# Patient Record
Sex: FEMALE | Race: WHITE | Employment: PART TIME | URBAN - METROPOLITAN AREA
[De-identification: names, ages, dates, MRNs, and addresses within clinical notes are randomized per-mention and may not be internally consistent; named-entity substitution may affect disease eponyms.]

---

## 2017-01-25 ENCOUNTER — OFFICE VISIT (OUTPATIENT)
Dept: FAMILY MEDICINE CLINIC | Age: 32
End: 2017-01-25

## 2017-01-25 VITALS
HEART RATE: 64 BPM | HEIGHT: 65 IN | WEIGHT: 145 LBS | SYSTOLIC BLOOD PRESSURE: 116 MMHG | BODY MASS INDEX: 24.16 KG/M2 | OXYGEN SATURATION: 99 % | DIASTOLIC BLOOD PRESSURE: 82 MMHG | RESPIRATION RATE: 16 BRPM | TEMPERATURE: 98.3 F

## 2017-01-25 DIAGNOSIS — G56.03 BILATERAL CARPAL TUNNEL SYNDROME: Primary | ICD-10-CM

## 2017-01-25 DIAGNOSIS — M25.561 CHRONIC PAIN OF BOTH KNEES: ICD-10-CM

## 2017-01-25 DIAGNOSIS — M25.562 CHRONIC PAIN OF BOTH KNEES: ICD-10-CM

## 2017-01-25 DIAGNOSIS — G89.29 CHRONIC PAIN OF BOTH KNEES: ICD-10-CM

## 2017-01-25 NOTE — MR AVS SNAPSHOT
Visit Information Date & Time Provider Department Dept. Phone Encounter #  
 1/25/2017 11:00 AM Silver Han, [facility] [id number] Follow-up Instructions Return if symptoms worsen or fail to improve, for for well exam. Upcoming Health Maintenance Date Due DTaP/Tdap/Td series (1 - Tdap) 7/27/2007 PAP AKA CERVICAL CYTOLOGY 10/11/2015 INFLUENZA AGE 9 TO ADULT 8/1/2016 Allergies as of 1/25/2017  Review Complete On: 1/25/2017 By: Silver Han MD  
 No Known Allergies Current Immunizations  Never Reviewed No immunizations on file. Not reviewed this visit You Were Diagnosed With   
  
 Codes Comments Bilateral carpal tunnel syndrome    -  Primary ICD-10-CM: G56.03 
ICD-9-CM: 354.0 Chronic pain of both knees     ICD-10-CM: M25.561, M25.562, G89.29 ICD-9-CM: 719.46, 338.29 Vitals BP Pulse Temp Resp Height(growth percentile) Weight(growth percentile) 116/82 (BP 1 Location: Left arm, BP Patient Position: Sitting) 64 98.3 °F (36.8 °C) (Oral) 16 5' 5\" (1.651 m) 145 lb (65.8 kg) SpO2 BMI OB Status Smoking Status 99% 24.13 kg/m2 IUD Never Smoker BMI and BSA Data Body Mass Index Body Surface Area  
 24.13 kg/m 2 1.74 m 2 Preferred Pharmacy Pharmacy Name Phone [address], [address] [place] [address] [phone] Your Updated Medication List  
  
   
This list is accurate as of: 1/25/17 11:53 AM.  Always use your most recent med list.  
  
  
  
  
 cetirizine 10 mg tablet Commonly known as:  ZYRTEC Take 10 mg by mouth. Follow-up Instructions Return if symptoms worsen or fail to improve, for for well exam.  
  
  
Patient Instructions Carpal Tunnel Syndrome: Exercises Your Care Instructions Here are some examples of typical rehabilitation exercises for your condition. Start each exercise slowly. Ease off the exercise if you start to have pain. Your doctor or your physical or occupational therapist will tell you when you can start these exercises and which ones will work best for you. How to do the exercises Note: When you no longer have pain or numbness, you can do exercises to help prevent carpal tunnel syndrome from coming back. Do not do any stretch or movement that is uncomfortable or painful. Warm-up stretches 1. Rotate your wrist up, down, and from side to side. Repeat 4 times. 2. Stretch your fingers far apart. Relax them, and then stretch them again. Repeat 4 times. 3. Stretch your thumb by pulling it back gently, holding it, and then releasing it. Repeat 4 times. Prayer stretch 1. Start with your palms together in front of your chest just below your chin. 2. Slowly lower your hands toward your waistline, keeping your hands close to your stomach and your palms together until you feel a mild to moderate stretch under your forearms. 3. Hold for at least 15 to 30 seconds. Repeat 2 to 4 times. Wrist flexor stretch 1. Extend your arm in front of you with your palm up. 2. Bend your wrist, pointing your hand toward the floor. 3. With your other hand, gently bend your wrist farther until you feel a mild to moderate stretch in your forearm. 4. Hold for at least 15 to 30 seconds. Repeat 2 to 4 times. Wrist extensor stretch Repeat steps 1 through 4 of the stretch above, but begin with your extended hand palm down. Follow-up care is a key part of your treatment and safety. Be sure to make and go to all appointments, and call your doctor if you are having problems. It's also a good idea to know your test results and keep a list of the medicines you take. Where can you learn more? Go to http://tushar-nory.info/. Enter H047 in the search box to learn more about \"Carpal Tunnel Syndrome: Exercises. \" Current as of: May 23, 2016 Content Version: 11.1 © 6467-9479 [employer]. Care instructions adapted under license by [employer] (which disclaims liability or warranty for this information). If you have questions about a medical condition or this instruction, always ask your healthcare professional. [address] any warranty or liability for your use of this information. Knee: Exercises Your Care Instructions Here are some examples of exercises for your knee. Start each exercise slowly. Ease off the exercise if you start to have pain. Your doctor or physical therapist will tell you when you can start these exercises and which ones will work best for you. How to do the exercises [employer] 4. Sit with your leg straight and supported on the floor or a firm bed. (If you feel discomfort in the front or back of your knee, place a small towel roll under your knee.) 5. Tighten the muscles on top of your thigh by pressing the back of your knee flat down to the floor. (If you feel discomfort under your kneecap, place a small towel roll under your knee.) 6. Hold for about 6 seconds, then rest for up to 10 seconds. 7. Do 8 to 12 repetitions several times a day. Straight-leg raises to the front 4. Lie on your back with your good knee bent so that your foot rests flat on the floor. Your injured leg should be straight. Make sure that your low back has a normal curve. You should be able to slip your flat hand in between the floor and the small of your back, with your palm touching the floor and your back touching the back of your hand. 5. Tighten the thigh muscles in the injured leg by pressing the back of your knee flat down to the floor. Hold your knee straight. 6. Keeping the thigh muscles tight, lift your injured leg up so that your heel is about 12 inches off the floor. Hold for about 6 seconds and then lower slowly. 7. Do 8 to 12 repetitions, 3 times a day. Straight-leg raises to the outside 5. Lie on your side, with your injured leg on top. 6. Tighten the front thigh muscles of your injured leg to keep your knee straight. 7. Keep your hip and your leg straight in line with the rest of your body, and keep your knee pointing forward. Do not drop your hip back. 8. Lift your injured leg straight up toward the ceiling, about 12 inches off the floor. Hold for about 6 seconds, then slowly lower your leg. 9. Do 8 to 12 repetitions. Straight-leg raises to the back 1. Lie on your stomach, and lift your leg straight up behind you (toward the ceiling). 2. Lift your toes about 6 inches off the floor, hold for about 6 seconds, then lower slowly. 3. Do 8 to 12 repetitions. Straight-leg raises to the inside 1. Lie on the side of your body with the injured leg. 2. You can either prop your other (good) leg up on a chair, or you can bend your good knee and put that foot in front of your injured knee. Do not drop your hip back. 3. Tighten the muscles on the front of your thigh to straighten your injured knee. 4. Keep your kneecap pointing forward, and lift your whole leg up toward the ceiling about 6 inches. Hold for about 6 seconds, then lower slowly. 5. Do 8 to 12 repetitions. Heel dig bridging 1. Lie on your back with both knees bent and your ankles bent so that only your heels are digging into the floor. Your knees should be bent about 90 degrees. 2. Then push your heels into the floor, squeeze your buttocks, and lift your hips off the floor until your shoulders, hips, and knees are all in a straight line. 3. Hold for about 6 seconds as you continue to breathe normally, and then slowly lower your hips back down to the floor and rest for up to 10 seconds. 4. Do 8 to 12 repetitions. Hamstring curls 1. Lie on your stomach with your knees straight. If your kneecap is uncomfortable, roll up a washcloth and put it under your leg just above your kneecap. 2. Lift the foot of your injured leg by bending the knee so that you bring the foot up toward your buttock. If this motion hurts, try it without bending your knee quite as far. This may help you avoid any painful motion. 3. Slowly lower your leg back to the floor. 4. Do 8 to 12 repetitions. 5. With permission from your doctor or physical therapist, you may also want to add a cuff weight to your ankle (not more than 5 pounds). With weight, you do not have to lift your leg more than 12 inches to get a hamstring workout. Shallow standing knee bends Note: Do this exercise only if you have very little pain; if you have no clicking, locking, or giving way if you have an injured knee; and if it does not hurt while you are doing 8 to 12 repetitions. 1. Stand with your hands lightly resting on a counter or chair in front of you. Put your feet shoulder-width apart. 2. Slowly bend your knees so that you squat down like you are going to sit in a chair. Make sure your knees do not go in front of your toes. 3. Lower yourself about 6 inches. Your heels should remain on the floor at all times. 4. Rise slowly to a standing position. Heel raises 1. Stand with your feet a few inches apart, with your hands lightly resting on a counter or chair in front of you. 2. Slowly raise your heels off the floor while keeping your knees straight. 3. Hold for about 6 seconds, then slowly lower your heels to the floor. 4. Do 8 to 12 repetitions several times during the day. Follow-up care is a key part of your treatment and safety. Be sure to make and go to all appointments, and call your doctor if you are having problems. It's also a good idea to know your test results and keep a list of the medicines you take. Where can you learn more? Go to http://tushar-nory.info/. Enter T198 in the search box to learn more about \"Knee: Exercises. \" Current as of: May 23, 2016 Content Version: 11.1 © 0868-3611 [employer]. Care instructions adapted under license by [employer] (which disclaims liability or warranty for this information). If you have questions about a medical condition or this instruction, always ask your healthcare professional. [address] any warranty or liability for your use of this information. Introducing [facility]! [employer] introduces [employer] patient portal. Now you can access parts of your medical record, email your doctor's office, and request medication refills online. 1. In your internet browser, go to [email or web address] 2. Click on the First Time User? Click Here link in the Sign In box. You will see the New Member Sign Up page. 3. Enter your [employer] Access Code exactly as it appears below. You will not need to use this code after youve completed the sign-up process. If you do not sign up before the expiration date, you must request a new code. · [employer] Access Code: [email or web address] Expires: 4/25/2017 11:53 AM 
 
4. Enter the last four digits of your Social Security Number (xxxx) and Date of Birth (mm/dd/yyyy) as indicated and click Submit. You will be taken to the next sign-up page. 5. Create a [employer] ID. This will be your [employer] login ID and cannot be changed, so think of one that is secure and easy to remember. 6. Create a [employer] password. You can change your password at any time. 7. Enter your Password Reset Question and Answer. This can be used at a later time if you forget your password. 8. Enter your e-mail address. You will receive e-mail notification when new information is available in [address] 9. Click Sign Up. You can now view and download portions of your medical record. 10. Click the Download Summary menu link to download a portable copy of your medical information.  
 
If you have questions, please visit the Frequently Asked Questions section of the [employer]. Remember, [employer] is NOT to be used for urgent needs. For medical emergencies, dial 911. Now available from your iPhone and Android! Please provide this summary of care documentation to your next provider. Your primary care clinician is listed as NONE. If you have any questions after today's visit, please call [phone].

## 2017-01-25 NOTE — PROGRESS NOTES
HISTORY OF PRESENT ILLNESS  Antonia Hunter is a 28 y.o. female. HPI  Patient is here today for evaluation and treatment of: Bilateral Hand Numbness / Bilateral Knee Pain    Hand Numbness:  Present X 2 years; states that she noted worsening sx in hands over the holidays; she works as a  over the holidays and repetitively moves boxes which causes more pain ; pt has used aspercreme and copper gloves which have helped;  Pain is about a 6/10 in intensity; Has had diificulty sleeping with the pain ;     Knee Pain: c/o knee pain for the last few month she states when she sqats and gets up her knees hurt; no swelling; She does bend a lot with her current job as well; This is when she notices the sx most.    PMH,  Meds, Allergies, Family History, Social history reviewed        Current Outpatient Prescriptions:     cetirizine (ZYRTEC) 10 mg tablet, Take 10 mg by mouth., Disp: , Rfl:     Review of Systems   Constitutional: Negative for chills and fever. Musculoskeletal: Negative for falls and joint pain. Physical Exam   Constitutional: She appears well-developed and well-nourished. No distress. Cardiovascular: Normal rate and regular rhythm. Exam reveals no gallop and no friction rub. No murmur heard. Pulmonary/Chest: Breath sounds normal. No respiratory distress. She has no wheezes. She has no rales. Nursing note and vitals reviewed. hands; no edema; + tinels bilaterally; no evidence of synovitis; No arthritic nodules; no edema of hand;  NO TTP at wrists; Right thumb; no TTP ; neg finklesteins    Knees - grossly within normal limits; No joint laxity with varus or valgus stress;   No joint line tenderness to palpation; + crepitus with passive ROM at right knee; none on left  Visit Vitals    /82 (BP 1 Location: Left arm, BP Patient Position: Sitting)    Pulse 64    Temp 98.3 °F (36.8 °C) (Oral)    Resp 16    Ht 5' 5\" (1.651 m)    Wt 145 lb (65.8 kg)    SpO2 99%    BMI 24.13 kg/m2         ASSESSMENT and PLAN    ICD-10-CM ICD-9-CM    1. Bilateral carpal tunnel syndrome G56.03 354.0    2. Chronic pain of both knees M25.561 719.46     M25.562 338.29     G89.29         As above,   All new  Knee pain likely due to mechanics  Pt prefers conservative management at this time  Advised continued use of wrist splints ( copper or otherwise) NSAID if needed;  Prudent use advised  CTS exercises provided; If sx worsen, notify MD  Knee exercises advised for knee pain; avoid motions which aggravate sx  Follow-up Disposition:  Return if symptoms worsen or fail to improve, for for well exam.  An After Visit Summary was printed and given to the patient. This has been fully explained to the patient, who indicates understanding.

## 2017-01-25 NOTE — PATIENT INSTRUCTIONS
Carpal Tunnel Syndrome: Exercises  Your Care Instructions  Here are some examples of typical rehabilitation exercises for your condition. Start each exercise slowly. Ease off the exercise if you start to have pain. Your doctor or your physical or occupational therapist will tell you when you can start these exercises and which ones will work best for you. How to do the exercises  Note: When you no longer have pain or numbness, you can do exercises to help prevent carpal tunnel syndrome from coming back. Do not do any stretch or movement that is uncomfortable or painful. Warm-up stretches  1. Rotate your wrist up, down, and from side to side. Repeat 4 times. 2. Stretch your fingers far apart. Relax them, and then stretch them again. Repeat 4 times. 3. Stretch your thumb by pulling it back gently, holding it, and then releasing it. Repeat 4 times. Prayer stretch    1. Start with your palms together in front of your chest just below your chin. 2. Slowly lower your hands toward your waistline, keeping your hands close to your stomach and your palms together until you feel a mild to moderate stretch under your forearms. 3. Hold for at least 15 to 30 seconds. Repeat 2 to 4 times. Wrist flexor stretch    1. Extend your arm in front of you with your palm up. 2. Bend your wrist, pointing your hand toward the floor. 3. With your other hand, gently bend your wrist farther until you feel a mild to moderate stretch in your forearm. 4. Hold for at least 15 to 30 seconds. Repeat 2 to 4 times. Wrist extensor stretch    Repeat steps 1 through 4 of the stretch above, but begin with your extended hand palm down. Follow-up care is a key part of your treatment and safety. Be sure to make and go to all appointments, and call your doctor if you are having problems. It's also a good idea to know your test results and keep a list of the medicines you take. Where can you learn more?   Go to http://tushar-nory.info/. Enter N641 in the search box to learn more about \"Carpal Tunnel Syndrome: Exercises. \"  Current as of: May 23, 2016  Content Version: 11.1  © 6637-6380 [employer]. Care instructions adapted under license by [employer] (which disclaims liability or warranty for this information). If you have questions about a medical condition or this instruction, always ask your healthcare professional. [place] [address] any warranty or liability for your use of this information. Knee: Exercises  Your Care Instructions  Here are some examples of exercises for your knee. Start each exercise slowly. Ease off the exercise if you start to have pain. Your doctor or physical therapist will tell you when you can start these exercises and which ones will work best for you. How to do the exercises  Quad sets    4. Sit with your leg straight and supported on the floor or a firm bed. (If you feel discomfort in the front or back of your knee, place a small towel roll under your knee.)  5. Tighten the muscles on top of your thigh by pressing the back of your knee flat down to the floor. (If you feel discomfort under your kneecap, place a small towel roll under your knee.)  6. Hold for about 6 seconds, then rest for up to 10 seconds. 7. Do 8 to 12 repetitions several times a day. Straight-leg raises to the front    4. Lie on your back with your good knee bent so that your foot rests flat on the floor. Your injured leg should be straight. Make sure that your low back has a normal curve. You should be able to slip your flat hand in between the floor and the small of your back, with your palm touching the floor and your back touching the back of your hand. 5. Tighten the thigh muscles in the injured leg by pressing the back of your knee flat down to the floor. Hold your knee straight.   6. Keeping the thigh muscles tight, lift your injured leg up so that your heel is about 12 inches off the floor. Hold for about 6 seconds and then lower slowly. 7. Do 8 to 12 repetitions, 3 times a day. Straight-leg raises to the outside    5. Lie on your side, with your injured leg on top. 6. Tighten the front thigh muscles of your injured leg to keep your knee straight. 7. Keep your hip and your leg straight in line with the rest of your body, and keep your knee pointing forward. Do not drop your hip back. 8. Lift your injured leg straight up toward the ceiling, about 12 inches off the floor. Hold for about 6 seconds, then slowly lower your leg. 9. Do 8 to 12 repetitions. Straight-leg raises to the back    1. Lie on your stomach, and lift your leg straight up behind you (toward the ceiling). 2. Lift your toes about 6 inches off the floor, hold for about 6 seconds, then lower slowly. 3. Do 8 to 12 repetitions. Straight-leg raises to the inside    1. Lie on the side of your body with the injured leg. 2. You can either prop your other (good) leg up on a chair, or you can bend your good knee and put that foot in front of your injured knee. Do not drop your hip back. 3. Tighten the muscles on the front of your thigh to straighten your injured knee. 4. Keep your kneecap pointing forward, and lift your whole leg up toward the ceiling about 6 inches. Hold for about 6 seconds, then lower slowly. 5. Do 8 to 12 repetitions. Heel dig bridging    1. Lie on your back with both knees bent and your ankles bent so that only your heels are digging into the floor. Your knees should be bent about 90 degrees. 2. Then push your heels into the floor, squeeze your buttocks, and lift your hips off the floor until your shoulders, hips, and knees are all in a straight line. 3. Hold for about 6 seconds as you continue to breathe normally, and then slowly lower your hips back down to the floor and rest for up to 10 seconds. 4. Do 8 to 12 repetitions. Hamstring curls    1.  Lie on your stomach with your knees straight. If your kneecap is uncomfortable, roll up a washcloth and put it under your leg just above your kneecap. 2. Lift the foot of your injured leg by bending the knee so that you bring the foot up toward your buttock. If this motion hurts, try it without bending your knee quite as far. This may help you avoid any painful motion. 3. Slowly lower your leg back to the floor. 4. Do 8 to 12 repetitions. 5. With permission from your doctor or physical therapist, you may also want to add a cuff weight to your ankle (not more than 5 pounds). With weight, you do not have to lift your leg more than 12 inches to get a hamstring workout. Shallow standing knee bends    Note: Do this exercise only if you have very little pain; if you have no clicking, locking, or giving way if you have an injured knee; and if it does not hurt while you are doing 8 to 12 repetitions. 1. Stand with your hands lightly resting on a counter or chair in front of you. Put your feet shoulder-width apart. 2. Slowly bend your knees so that you squat down like you are going to sit in a chair. Make sure your knees do not go in front of your toes. 3. Lower yourself about 6 inches. Your heels should remain on the floor at all times. 4. Rise slowly to a standing position. Heel raises    1. Stand with your feet a few inches apart, with your hands lightly resting on a counter or chair in front of you. 2. Slowly raise your heels off the floor while keeping your knees straight. 3. Hold for about 6 seconds, then slowly lower your heels to the floor. 4. Do 8 to 12 repetitions several times during the day. Follow-up care is a key part of your treatment and safety. Be sure to make and go to all appointments, and call your doctor if you are having problems. It's also a good idea to know your test results and keep a list of the medicines you take. Where can you learn more?   Go to http://tushar-nory.info/. Enter P107 in the search box to learn more about \"Knee: Exercises. \"  Current as of: May 23, 2016  Content Version: 11.1  © 8922-2495 [employer]. Care instructions adapted under license by [employer] (which disclaims liability or warranty for this information). If you have questions about a medical condition or this instruction, always ask your healthcare professional. [place] [address] any warranty or liability for your use of this information.

## 2017-10-18 ENCOUNTER — OFFICE VISIT (OUTPATIENT)
Dept: FAMILY MEDICINE CLINIC | Age: 32
End: 2017-10-18

## 2017-10-18 VITALS
OXYGEN SATURATION: 98 % | RESPIRATION RATE: 18 BRPM | HEART RATE: 75 BPM | DIASTOLIC BLOOD PRESSURE: 81 MMHG | HEIGHT: 65 IN | TEMPERATURE: 97.8 F | BODY MASS INDEX: 24.66 KG/M2 | SYSTOLIC BLOOD PRESSURE: 119 MMHG | WEIGHT: 148 LBS

## 2017-10-18 DIAGNOSIS — R20.2 PARESTHESIA OF RIGHT UPPER LIMB: ICD-10-CM

## 2017-10-18 DIAGNOSIS — M25.552 LEFT HIP PAIN: Primary | ICD-10-CM

## 2017-10-18 NOTE — PROGRESS NOTES
1. Have you been to the ER, urgent care clinic since your last visit? Hospitalized since your last visit? No    2. Have you seen or consulted any other health care providers outside of the [address] [place] [address] since your last visit? Include any pap smears or colon screening.  Yes Where: OBGYN at [facility] - Dr. Cici Swartz

## 2017-10-18 NOTE — PATIENT INSTRUCTIONS
Hip Bursitis: Exercises  Your Care Instructions  Here are some examples of typical rehabilitation exercises for your condition. Start each exercise slowly. Ease off the exercise if you start to have pain. Your doctor or physical therapist will tell you when you can start these exercises and which ones will work best for you. How to do the exercises  Hip rotator stretch    1. Lie on your back with both knees bent and your feet flat on the floor. 2. Put the ankle of your affected leg on your opposite thigh near your knee. 3. Use your hand to gently push your knee away from your body until you feel a gentle stretch around your hip. 4. Hold the stretch for 15 to 30 seconds. 5. Repeat 2 to 4 times. 6. Repeat steps 1 through 5, but this time use your hand to gently pull your knee toward your opposite shoulder. Iliotibial band stretch    1. Lean sideways against a wall. If you are not steady on your feet, hold on to a chair or counter. 2. Stand on the leg with the affected hip, with that leg close to the wall. Then cross your other leg in front of it. 3. Let your affected hip drop out to the side of your body and against wall. Then lean away from your affected hip until you feel a stretch. 4. Hold the stretch for 15 to 30 seconds. 5. Repeat 2 to 4 times. Straight-leg raises to the outside    1. Lie on your side, with your affected hip on top. 2. Tighten the front thigh muscles of your top leg to keep your knee straight. 3. Keep your hip and your leg straight in line with the rest of your body, and keep your knee pointing forward. Do not drop your hip back. 4. Lift your top leg straight up toward the ceiling, about 12 inches off the floor. Hold for about 6 seconds, then slowly lower your leg. 5. Repeat 8 to 12 times. Clamshell    1. Lie on your side, with your affected hip on top and your head propped on a pillow. Keep your feet and knees together and your knees bent.   2. Raise your top knee, but keep your feet together. Do not let your hips roll back. Your legs should open up like a clamshell. 3. Hold for 6 seconds. 4. Slowly lower your knee back down. Rest for 10 seconds. 5. Repeat 8 to 12 times. Follow-up care is a key part of your treatment and safety. Be sure to make and go to all appointments, and call your doctor if you are having problems. It's also a good idea to know your test results and keep a list of the medicines you take. Where can you learn more? Go to http://tushar-nory.info/. Enter G985 in the search box to learn more about \"Hip Bursitis: Exercises. \"  Current as of: March 21, 2017  Content Version: 11.3  © 3527-1474 [employer]. Care instructions adapted under license by [employer] (which disclaims liability or warranty for this information). If you have questions about a medical condition or this instruction, always ask your healthcare professional. [place] [address] any warranty or liability for your use of this information.

## 2017-10-18 NOTE — PROGRESS NOTES
HISTORY OF PRESENT ILLNESS  Nasim Shelby is a 33 y.o. female. HPI  Pt c/o numbness in her hand for a  Few months. hand numbness is better; may worsen witho begin workino have helped. Plans t activity ; sewing, etc.  States that she tries not to take any meds for the pain. Has used copper fit bracelets and theses seem to help. 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 Also c/o left hip pain. Hip pain has been present X 1 month. No injuries; Helped by walking and loosening; worse with sitting for a longer period of time. Sx will go completely away when she walks. PMH,  Meds, Allergies, Family History, Social history reviewed    Review of Systems   Cardiovascular: Negative for chest pain and palpitations. Musculoskeletal: Positive for myalgias. Negative for back pain and neck pain. Physical Exam   Constitutional: She appears well-developed and well-nourished. No distress. Cardiovascular: Normal rate and regular rhythm. Exam reveals no gallop and no friction rub. No murmur heard. Pulmonary/Chest: Breath sounds normal. No respiratory distress. She has no wheezes. She has no rales. Musculoskeletal:   + tinels at right wrist; no edema, no TTP at left hip;  ROM is intact;    Psychiatric: She has a normal mood and affect. Nursing note and vitals reviewed. ASSESSMENT and PLAN    ICD-10-CM ICD-9-CM    1. Left hip pain M25.552 719.45 XR HIP LT W OR WO PELV 2-3 VWS   2. Paresthesia of right upper limb R20.2 782.0        As above, #1 new, #2 better   treatment plan as listed below  Orders Placed This Encounter    XR HIP LT W OR WO PELV 2-3 VWS    levonorgestrel (MIRENA) 20 mcg/24 hr (5 years) IUD- med rec     Exercises to the right hip; consider tylenol PRN  Desires to monitor paresthesias which is likely due to CTS before EMG, referral  Follow-up Disposition:  Return if symptoms worsen or fail to improve.   An After Visit Summary was printed and given to the patient. This has been fully explained to the patient, who indicates understanding.

## 2017-10-20 ENCOUNTER — HOSPITAL ENCOUNTER (OUTPATIENT)
Dept: GENERAL RADIOLOGY | Age: 32
Discharge: HOME | End: 2017-10-20
Payer: COMMERCIAL

## 2017-10-20 DIAGNOSIS — M25.552 LEFT HIP PAIN: ICD-10-CM

## 2017-10-20 PROCEDURE — 73502 X-RAY EXAM HIP UNI 2-3 VIEWS: CPT

## 2017-10-30 ENCOUNTER — TELEPHONE (OUTPATIENT)
Dept: FAMILY MEDICINE CLINIC | Age: 32
End: 2017-10-30

## 2017-10-30 NOTE — TELEPHONE ENCOUNTER
Pt would like to know results of her xray please advise [phone].  Pt states still in pain off and on

## 2019-01-16 ENCOUNTER — HOSPITAL ENCOUNTER (OUTPATIENT)
Dept: GENERAL RADIOLOGY | Age: 34
Discharge: HOME | End: 2019-01-16
Payer: COMMERCIAL

## 2019-01-16 DIAGNOSIS — M54.50 LOW BACK PAIN: ICD-10-CM

## 2019-01-16 PROCEDURE — 72110 X-RAY EXAM L-2 SPINE 4/>VWS: CPT

## 2019-01-21 ENCOUNTER — HOSPITAL ENCOUNTER (OUTPATIENT)
Dept: PHYSICAL THERAPY | Age: 34
Discharge: HOME | End: 2019-01-21
Payer: COMMERCIAL

## 2019-01-21 PROCEDURE — 97140 MANUAL THERAPY 1/> REGIONS: CPT

## 2019-01-21 PROCEDURE — 97161 PT EVAL LOW COMPLEX 20 MIN: CPT

## 2019-01-21 NOTE — PROGRESS NOTES
PT DAILY TREATMENT NOTE/LUMBAR EVAL 10-18 Patient Name: Gosia Heath 
Date:2019 : 1985 [x]  Patient  Verified Payor: Sudhir Backer / Plan: Jerrilyn [employer] HMO / Product Type: HMO /   
In time:10:00  Out time:10:44 Total Treatment Time (min): 44 Visit #: 1  Treatment Area: Low back pain [M54.5] SUBJECTIVE Pain Level (0-10 scale): 1/10 []constant [x]intermittent []improving []worsening []no change since onset Any medication changes, allergies to medications, adverse drug reactions, diagnosis change, or new procedure performed?: [x] No    [] Yes (see summary sheet for update) Subjective functional status/changes:    
PLOF: Ind & pain free with all activites Limitations to PLOF: pain at times so intense; unable to ambulate Mechanism of Injury: 19 Pt woke up with severe LBP that kept her from being able to walk. No identifiable mechanism of injury; pt suspects increase in physical demand at work over the holiday season caused onset of pain. Pain & numbness in BLE (anterior & posterior) for the first 3 days; has not occurred since. MD prescribed muscle relaxer however pt has not utilized them due to s/s being \"managable\" since. Current symptoms/Complaints: intermittent pain limiting ability to complete professional duties (lifting boxes up to 50lbs) & pain with movement. Previous Treatment/Compliance: Dr visit for pain; compliant with HEP stretching from Dr: no utilization of muscle relaxer Rx. Never attended PT for anything. PMHx/Surgical Hx: ovarian cyst surgery: last one ~. Currently no menstruation secondary to IUD. 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 Barriers: []pain []financial []time []transportation [x]none Motivation: good Cognition: A & O x 3 OBJECTIVE/EXAMINATION 
 34 min [x]Eval                  []Re-Eval  
 
10 min Manual: STM & TPR to lumbar & thoracic paraspinals, glut med & max Rationale: increase ROM and decrease pain  to improve the patients ability to return to PLOF with less pain. With 
 [] TE 
 [x] TA 
 [] neuro 
 [] other: Patient Education: [x] Review HEP [] Progressed/Changed HEP based on:  
[] positioning   [x] body mechanics   [] transfers   [] heat/ice application   
[] other:   
 
Other Objective/Functional Measures:  
 
Symptoms: 
Aggravated by: 
 [x] Bending: lumbar flexion [] Sitting [] Standing [] Walking 
 [] Moving [] Cough [] Sneeze [] Valsalva 
 [] AM  [] PM  Lying:  [] sup   [] pro   [] sidelying 
 [] Other: 
  
Eased by:  
 [x] Bending: lumbar extension [] Sitting [] Standing [] Walking 
 [] Moving [] AM  [] PM  Lying: [] sup  [] pro  [] sidelying 
 [] Other: 
  
General Health:  Red Flags Indicated? [] Yes    [x] No 
[] Yes [x] No Recent weight change (If yes, due to dieting? [] Yes  [] No) [] Yes [] No Weakness in legs during walking 
[] Yes [x] No Unremitting pain at night 
[] Yes [x] No Abdominal pain or problems 
[] Yes [x] No Rectal bleeding 
[] Yes [] No Feet more cold or painful in cold weather 
[] Yes [x] No Menstrual irregularities 
[] Yes [] No Blood or pain with urination 
[] Yes [x] No Dysfunction of bowel or bladder 
[] Yes [] No Recent illness within past 3 weeks (i.e, cold, flu) 
[] Yes [x] No Numbness/tingling in buttock/genitalia region Past History/Treatments:  
 
Diagnostic Tests: [] Lab work [x] X-rays    [] CT [] MRI     [] Other: 
Results: X-ray lumbar spine 01.16.19:  
1. No acute fracture or subluxation. 2.  IUD in place. Retroflexed uterus. Functional Status What position do you sleep in?: \"side sleeping is fine, pain when turning over, but still able to sleep through the night. \" No limit to length of time able to sit without pain. OBJECTIVE 
BP: 134/84mmHg (manual measure x2) - pt denies any cardiac issues: pt educated on significance & importance of BP. Pt informed of pre-hypertensive BP range & encouraged to monitor. Posture: 
Lateral Shift: [] R    [] L     [] +  [] - 
Kyphosis: [] Increased [] Decreased   [x]  WNL Lordosis:  [] Increased [x] Decreased   [] WNL Pelvic symmetry: [] WNL    [] Other: 
 
Gait:  [x] Normal     [] Abnormal: 
 
Active Movements: [] N/A   [] Too acute   [] Other: ROM % AROM Comments:pain, area Forward flexion 40-60 24cm frm floor Pain Extension 20-30 -25%  relief of pain SB right 20-30 48cm stretch SB left 20-30 50 stretch Rotation right 5-10 35' Rotation left 5-10 25' Neuro Screen [x] WNL Myotome/Dermatome/Reflexes: 
Comments: BLE dermatomes in tact to light touch. Normal patellar & achilles reflexes bilaterally. Dural Mobility: SLR Sitting: [] R    [] L    [] +    [] -  @ (degrees):  
        Supine: [] R    [] L    [] +    [] -  @ (degrees):  
Slump Test: [x] R    [x] L    [] +    [x] -  @ (degrees): Prone Knee Bend: [] R    [] L    [] +    [] -  
 
Palpation [] Min  [x] Mod  [] Severe    Location: t/s & l/s paraspinals: > on right 
[] Min  [x] Mod  [] Severe    Location: glut med Strength 
 L(0-5) R (0-5) N/T Hip Flexion (L1,2) 4+ 4+ [] Knee Extension (L3,4) 5 5 [] Ankle Dorsiflexion (L4) 5 5 [] Ankle Plantarflexion (S1) 5 5 [] Knee Flexion (S1,2) 5 5 [] Hip adduction 4 4 [] Hip abduction  4 4 [] Hip extension 4 4 [] Special Tests Lumbar:  Lumb. Compression: [] Pos  [x] Neg Sacroilliac:  Gaenslen's: [] R    [] L    [] +    [] -  
  Compression: [] +    [] -  
  Gapping:  [] +    [] - Thigh Thrust: [] R    [] L    [] +    [] - Leg Length: [] +    [] -   Position: 
  Crests: 
  ASIS: 
  PSIS:  
  Sacral Sulcus: 
  Mobility: right PSIS hypomobile in seated flexion & extension test 
 
Other tests/comments: 
Quadriped:  
left UE flexion with right LE extension: 23sec right UE flexion with left LE extension: 19sec Plank on elbows: 11sec Pain Level (0-10 scale) post treatment: 2/10 ASSESSMENT/Changes in Function:  
[x]  See Plan of Care 
[]  See progress note/recertification 
[]  See Discharge Summary Progress towards goals / Updated goals: 
[x]  See Plan of Care PLAN [x]  Upgrade activities as tolerated     [x]  Continue plan of care 
[]  Update interventions per flow sheet      
[]  Discharge due to:_ 
[]  Other:_ NAFISA Irby 1/21/2019  8:16 AM

## 2019-01-21 NOTE — PROGRESS NOTES
[facility] [address] [facility] 
[phone] fax Plan of Care/ Statement of Necessity for Physical Therapy Services Patient name: Kayden Alaniz Start of Care: 2019 Referral source: Trish Garsia MD : 1985 Medical Diagnosis: Low back pain [M54.5] Payor: Rosy Wiley / Plan: Estela Coleman HMO / Product Type: HMO /  Onset Date: 19 Treatment Diagnosis: LBP Prior Hospitalization: see medical history Provider#: [id number] Medications: Verified on Patient summary List  
 Comorbidities: none Prior Level of Function: Ind & pain free with all activities The Plan of Care and following information is based on the information from the initial evaluation. Assessment/ key information:  
Pt is a 30 y/o female presenting to clinic with low back pain. Initial onset of pain began 19 with no identifiable mechanism of injury. Pt woke up unable to walk due to severe pain in back & BLE with pain & numbness traveling down anterior & posterior BLE. This severe pain & radicular symptoms lasted ~3 days. Pt went to Dr where she was prescribed muscle relaxer & a stretching HEP for gluts & piriformis. L/s x-rays on 19 report no acute fracture or subluxation. Radicular symptoms have not occurred since initial onset of pain & neural testing at [facility] were negative suggesting no sciatic involvement. Pt reports not using the muscle relaxer as pain has been \"managable\" since. Pt is in otherwise good health with no red flag s/s or reported comorbitities. BP measured manually x2 was 134/84mmHg. Pt denies any history of cardiac issues: pt educated on significance & importance of BP: informed of pre-hypertensive BP range & encouraged to monitor. Pt presents with mild decrease in l/s ROM & mild core & hip strength deficits. TTP with moderate pain in bilateral t/s & l/s paraspinals and glut med.  Manual release of trigger points was performed at eval with decreased tone noted. Decrease lumbar lordosis, forward head and rounded shoulders observed and pt awareness of poor posture contributing to back pain was confirmed. Pt demos improper lifting mechanics on 100% of trials. Skilled PT interventions to address the above deficits is medically necessary in order to allow pt to return to PLOF with less pain & to serve as a prophylactic measure to further musculoskeletal dysfunction. Evaluation Complexity History LOW Complexity : Zero comorbidities / personal factors that will impact the outcome / POC; Examination HIGH Complexity : 4+ Standardized tests and measures addressing body structure, function, activity limitation and / or participation in recreation  ;Presentation LOW Complexity : Stable, uncomplicated  ;Clinical Decision Making MEDIUM Complexity : FOTO score of 26-74 Overall Complexity Rating: LOW Problem List: pain affecting function, decrease ROM, decrease strength, decrease flexibility/ joint mobility and other pain with movement. Treatment Plan may include any combination of the following: Therapeutic exercise, Therapeutic activities, Neuromuscular re-education, Physical agent/modality, Gait/balance training, Manual therapy and Patient education Patient / Family readiness to learn indicated by: asking questions and trying to perform skills Persons(s) to be included in education: patient (P) and family support person (FSP);list Gabe Bonine (). Barriers to Learning/Limitations: None Patient Goal (s): know what to do if it flares up again & keep the pain down Patient Self Reported Health Status: fair Rehabilitation Potential: excellent Short Term Goals: To be accomplished in 1 weeks: 1. Pt will be independent & compliant with mild complexity HEP in order to demo progress towards long term PT goals. Long Term Goals: To be accomplished in 4 weeks: 1. Pt will be independent & compliant with moderate complexity HEP in order to sustain gains made in PT.  
2. Pt will demo >/= 4+/5 bilateral hip strength in all planes in order to demo progression towards PLOF. 3. Pt FOTO score will increase by >/= 15 points in order to demo increase in function improvement. 4. Pt will report pain does not exceed 3/10 in order to improve overall QOL. 5. Pt improve plank on elbow time to 30 seconds in order to demo improved core stability for improved biomechanics of l/s and prophylactic to further musculoskeletal dysfunction. 6. Pt will demo proper lifting mechanics with 50# box on 100% of trials in order to return to professional duties with decreased risk for injury. Frequency / Duration: Patient to be seen 2 times per week for 4 weeks. Patient/ Caregiver education and instruction: Diagnosis, prognosis, self care and exercises 
 [x]  Plan of care has been reviewed with JOVANNI Mojica , SPT 1/21/2019 11:53 AM 
 
________________________________________________________________________ I certify that the above Therapy Services are being furnished while the patient is under my care. I agree with the treatment plan and certify that this therapy is necessary. [de-identified] Signature:____________Date:_________TIME:________ 
 
[employer], Date and Time must be completed for valid certification ** Please sign and return to [facility] [place]  [address] [facility] 
[phone] fax

## 2019-01-22 ENCOUNTER — HOSPITAL ENCOUNTER (OUTPATIENT)
Dept: PHYSICAL THERAPY | Age: 34
Discharge: HOME | End: 2019-01-22
Payer: COMMERCIAL

## 2019-01-22 PROCEDURE — 97110 THERAPEUTIC EXERCISES: CPT

## 2019-01-22 NOTE — PROGRESS NOTES
PT DAILY TREATMENT NOTE 10-18 Patient Name: Katherine Garcia 
Date:2019 : 1985 [x]  Patient  Verified Payor: Vaishali Villanueva / Plan: Ace Donahue HMO / Product Type: HMO /   
In PQUU:[id number]  Out time:1104 Total Treatment Time (min): 24 Visit #: 2 of 8 Treatment Area: Low back pain [M54.5] SUBJECTIVE Pain Level (0-10 scale): 2/10 Any medication changes, allergies to medications, adverse drug reactions, diagnosis change, or new procedure performed?: [x] No    [] Yes (see summary sheet for update) Subjective functional status/changes:   [] No changes reported Pt stated that she has a little pain today, but it's not bad OBJECTIVE 24 min Therapeutic Exercise:  [x] See flow sheet :  
Rationale: increase ROM and increase strength to improve the patients ability to increase ease with ADLs With 
 [x] TE 
 [] TA 
 [] neuro 
 [] other: Patient Education: [x] Review HEP [] Progressed/Changed HEP based on:  
[] positioning   [] body mechanics   [] transfers   [] heat/ice application   
[] other:   
 
Other Objective/Functional Measures:  
Had no complaint of increased pain during session Box lift were challenging, used 6\" step instead of box 2* weight Used good form with exercises with minimal cueing Pain Level (0-10 scale) post treatment: 0-1/10 ASSESSMENT/Changes in Function:  
Initiated therex today per flow sheet. Pt reported compliance with HEP. Pt put forth good effort with all exercises Patient will continue to benefit from skilled PT services to modify and progress therapeutic interventions, address functional mobility deficits, address ROM deficits and address strength deficits to attain remaining goals. [x]  See Plan of Care 
[]  See progress note/recertification 
[]  See Discharge Summary Progress towards goals / Updated goals: 
Short Term Goals: To be accomplished in 1 weeks: 1.  Pt will be independent & compliant with mild complexity HEP in order to demo progress towards long term PT goals. Goal met. 19 Long Term Goals: To be accomplished in 4 weeks: 1. Pt will be independent & compliant with moderate complexity HEP in order to sustain gains made in PT.  
2. Pt will demo >/= 4+/5 bilateral hip strength in all planes in order to demo progression towards PLOF. 3. Pt FOTO score will increase by >/= 15 points in order to demo increase in function improvement. 4. Pt will report pain does not exceed 3/10 in order to improve overall QOL. 5. Pt improve plank on elbow time to 30 seconds in order to demo improved core stability for improved biomechanics of l/s and prophylactic to further musculoskeletal dysfunction. 6. Pt will demo proper lifting mechanics with 50# box on 100% of trials in order to return to professional duties with decreased risk for injury. PLAN 
[]  Upgrade activities as tolerated     [x]  Continue plan of care 
[]  Update interventions per flow sheet      
[]  Discharge due to:_ 
[]  Other:_ Kelsea Dietz PTA 2019  11:55 AM 
 
Future Appointments Date Time Provider Bibi Bell 2019 [facility]  
2019  9:30 [facility]  
2019  9:30 AM Lakesha León, PTA [facility]  
2019  1:00 PM Lakesha León, [facility]  
2019 10:00 AM Lakesha León, PTA [facility]  
2019 10:00 [facility]  
2019  9:30 AM [facility] [place]

## 2019-01-25 ENCOUNTER — HOSPITAL ENCOUNTER (OUTPATIENT)
Dept: PHYSICAL THERAPY | Age: 34
Discharge: HOME | End: 2019-01-25
Payer: COMMERCIAL

## 2019-01-25 PROCEDURE — 97110 THERAPEUTIC EXERCISES: CPT

## 2019-01-25 PROCEDURE — 97530 THERAPEUTIC ACTIVITIES: CPT

## 2019-01-25 NOTE — PROGRESS NOTES
PT DAILY TREATMENT NOTE 10-18 Patient Name: Rodo Fontana 
Date:2019 : 1985 [x]  Patient  Verified Payor: Anton Anderson / Plan: [employer] HMO / Product Type: HMO /   
In time:11:02  Out time: 11:41 Total Treatment Time (min): 39 Visit #: 3 of 8 Treatment Area: Low back pain [M54.5] SUBJECTIVE Pain Level (0-10 scale): 2.5/10 Any medication changes, allergies to medications, adverse drug reactions, diagnosis change, or new procedure performed?: [x] No    [] Yes (see summary sheet for update) Subjective functional status/changes:   [] No changes reported Pain in the low back; left > right. OBJECTIVE 23 min Therapeutic Exercise:  [x] See flow sheet :  
Rationale: increase ROM and increase strength to improve the patients ability to decrease pain with movement. 8 min Therapeutic Activity:  Lifting from floor mechanics & squat form. Scapular setting in shoulder strengthening Rationale: increase strength and increase proprioception  to improve the patients ability to return to PLOF with decreased risk for further injury. With 
 [x] TE 
 [] TA 
 [] neuro 
 [] other: Patient Education: [x] Review HEP [] Progressed/Changed HEP based on:  
[] positioning   [x] body mechanics   [] transfers   [] heat/ice application   
[x] other: education for pec stretch & scapular setting exercises to correct posture & decrease load on l/s Other Objective/Functional Measures:  
- pt reports pain has stayed pretty consistent throughout the week, no flare ups. - pt demos improper lifting mechanics at start of trials: holding weight out from trunk Pain Level (0-10 scale) post treatment: 1/10 ASSESSMENT/Changes in Function:  
- pt able to progress through TE today with good form & minor verbal tactile cueing for technique except for lifting mechanics: verbal & tactile cuing as well as demo needed for correct form in lifting technique. - added swissball roll out stretch for l/s; pt tolerated well. - pt maintained good form through planks until last trial of 5, each a 10 second hold. Patient will continue to benefit from skilled PT services to modify and progress therapeutic interventions, address strength deficits, analyze and address soft tissue restrictions, analyze and cue movement patterns, analyze and modify body mechanics/ergonomics and assess and modify postural abnormalities to attain remaining goals. Progress towards goals / Updated goals: 
Short Term Goals: To be accomplished in 1 weeks: 1. Pt will be independent & compliant with mild complexity HEP in order to demo progress towards long term PT goals. Met (1/25/19) Long Term Goals: To be accomplished in 4 weeks: 1. Pt will be independent & compliant with moderate complexity HEP in order to sustain gains made in PT.  
2. Pt will demo >/= 4+/5 bilateral hip strength in all planes in order to demo progression towards PLOF. 3. Pt FOTO score will increase by >/= 15 points in order to demo increase in function improvement. 4. Pt will report pain does not exceed 3/10 in order to improve overall QOL. 5. Pt improve plank on elbow time to 30 seconds in order to demo improved core stability for improved biomechanics of l/s and prophylactic to further musculoskeletal dysfunction. 6. Pt will demo proper lifting mechanics with 50# box on 100% of trials in order to return to professional duties with decreased risk for injury. PLAN [x]  Upgrade activities as tolerated     [x]  Continue plan of care 
[]  Update interventions per flow sheet      
[]  Discharge due to:_ 
[]  Other:_ Marques Mojica, SPT 1/25/2019  11:06 AM 
 
Future Appointments Date Time Provider Bibi Bell 1/28/2019  9:30 AM Janee Booker, PT [facility]  
1/31/2019  9:30 AM Nuris Chery, PTA [facility]  
2/4/2019  1:00 PM Nuris Chery PTA [facility]  
 2/6/2019 10:00 AM Deangelo Herndon, PTA [facility]  
2/11/2019 10:00 AM Romelia Mcguire, PT [facility]  
2/13/2019  9:30 AM Merna Landa, PT [facility]

## 2019-01-28 ENCOUNTER — HOSPITAL ENCOUNTER (OUTPATIENT)
Dept: PHYSICAL THERAPY | Age: 34
Discharge: HOME | End: 2019-01-28
Payer: COMMERCIAL

## 2019-01-28 PROCEDURE — 97110 THERAPEUTIC EXERCISES: CPT

## 2019-01-28 NOTE — PROGRESS NOTES
PT DAILY TREATMENT NOTE 10-18 Patient Name: Danielito Taylor 
Date:2019 : 1985 [x]  Patient  Verified Payor: Wendy Alcantara / Plan: [employer] / Product Type: HMO /   
In time: 9:33 Out time: 10:05 Total Treatment Time (min): 32 Visit #: 4 of 8 Treatment Area: Low back pain [M54.5] SUBJECTIVE Pain Level (0-10 scale): 2/10 Any medication changes, allergies to medications, adverse drug reactions, diagnosis change, or new procedure performed?: [x] No    [] Yes (see summary sheet for update). Subjective functional status/changes:   [] No changes reported Pt states that the pain comes and goes, however does not go over a 2/10. Pt reports continued difficulty with performing work activities and lifting. OBJECTIVE 32 min Therapeutic Exercise:  [x] See flow sheet :  
Rationale: increase ROM and increase strength to improve the patients ability to perform work activities with less pain/restrictions. With 
 [x] TE 
 [] TA 
 [] neuro 
 [] other: Patient Education: [x] Review HEP [] Progressed/Changed HEP based on:  
[] positioning   [] body mechanics   [] transfers   [] heat/ice application   
[] other: Reviewed HEP, pt verbalized understanding. Other Objective/Functional Measures:  
Verbal cues/demonstration required for widened base of support, incresaed use of LE musculature, and importance of keeping load close to the body when performing lifting activity. Pt able to complete activity with improved form and no pain noted in low back following cues. Pt challenged by activity secondary to LE muscle fatigue - evidenced by muscle shaking and reported fatigue. Pt able to complete full set of planks (10 second hold times 5 repetitions) on elbows with good form today. Pain Level (0-10 scale) post treatment: 1/10 ASSESSMENT/Changes in Function:  
Pt tolerated today's treatment session well with no reports of increased pain.  Treatment session focused on core activation and B hip strengthening. Pt continues to report difficulty performing work and lifting activities, with fluctuating low back pain noted. Pt demonstrated improved lifting mechanics from floor level today, with minimal verbal cues required for increased use of LE musculature and widened base of support for completion of lift. Pt will continue to benefit from skilled PT to address lifting mechanics and core/LE strengthening in order to improve ease noted with performing work activities. Patient will continue to benefit from skilled PT services to modify and progress therapeutic interventions, address functional mobility deficits, address ROM deficits, address strength deficits, analyze and address soft tissue restrictions, analyze and cue movement patterns, analyze and modify body mechanics/ergonomics, assess and modify postural abnormalities and instruct in home and community integration to attain remaining goals. [x]  See Plan of Care 
[]  See progress note/recertification 
[]  See Discharge Summary Progress towards goals / Updated goals: 
Short Term Goals: To be accomplished in 1 weeks: 1. Pt will be independent & compliant with mild complexity HEP in order to demo progress towards long term PT goals.  
Met (1/25/19) Long Term Goals: To be accomplished in 4 weeks: 1. Pt will be independent & compliant with moderate complexity HEP in order to sustain gains made in PT.  
2. Pt will demo >/= 4+/5 bilateral hip strength in all planes in order to demo progression towards PLOF. 3. Pt FOTO score will increase by >/= 15 points in order to demo increase in function improvement. 4. Pt will report pain does not exceed 3/10 in order to improve overall QOL.   
Goal met - pt reports pain remains lower than 2/10 1/28/19. 
5. Pt improve plank on elbow time to 30 seconds in order to demo improved core stability for improved biomechanics of l/s and prophylactic to further musculoskeletal dysfunction. 6. Pt will demo proper lifting mechanics with 50# box on 100% of trials in order to return to professional duties with decreased risk for injury.   
 
PLAN 
[]  Upgrade activities as tolerated     [x]  Continue plan of care 
[]  Update interventions per flow sheet      
[]  Discharge due to:_ 
[]  Other:_ Misael Maresruy 2019  9:28 AM 
 
I was present during the entire treatment, directing and participating in the treatment. Future Appointments Date Time Provider Bibi Bell 2019  9:30 AM Zeke Kwan, PT [facility]  
2019  9:30 AM Lakesha León, PTA [facility]  
2019  1:00 PM Lakesha León, PTA [facility]  
2019 10:00 AM Lakesha León, PTA [facility]  
2019 10:00 AM Zeke Kwan, PT [facility]  
2019  9:30 AM Shira Zamora, PT [facility]

## 2019-01-31 ENCOUNTER — HOSPITAL ENCOUNTER (OUTPATIENT)
Dept: PHYSICAL THERAPY | Age: 34
Discharge: HOME | End: 2019-01-31
Payer: COMMERCIAL

## 2019-01-31 PROCEDURE — 97110 THERAPEUTIC EXERCISES: CPT

## 2019-01-31 NOTE — PROGRESS NOTES
PT DAILY TREATMENT NOTE 10-18 Patient Name: Lorin Cogan 
Date:2019 : 1985 [x]  Patient  Verified Payor: Clarissa Odonnell / Plan: [employer] / Product Type: HMO /   
In time:925  Out time:957 Total Treatment Time (min): 33 Visit #: 5 of 8 Treatment Area: Low back pain [M54.5] SUBJECTIVE Pain Level (0-10 scale): 1/10 Any medication changes, allergies to medications, adverse drug reactions, diagnosis change, or new procedure performed?: [x] No    [] Yes (see summary sheet for update) Subjective functional status/changes:   [] No changes reported Pt stated that she is doing well today OBJECTIVE 33 min Therapeutic Exercise:  [x] See flow sheet :  
Rationale: increase ROM and increase strength to improve the patients ability to increase ease with ADLs With 
 [x] TE 
 [] TA 
 [] neuro 
 [] other: Patient Education: [x] Review HEP [] Progressed/Changed HEP based on:  
[] positioning   [] body mechanics   [] transfers   [] heat/ice application   
[] other:   
 
Other Objective/Functional Measures:  
Had no complaint of increased pain during session No difficulty with exercises Uses good form with all exercises with minimal cueing Good form with box lifts Pain Level (0-10 scale) post treatment: 0-110 ASSESSMENT/Changes in Function:  
Pt is progressing well toward goals. Cont with decreased core strength, but is slowly improving. Pt is able to lift a 15# box without difficulty. B hip strength is improving Patient will continue to benefit from skilled PT services to modify and progress therapeutic interventions, address functional mobility deficits, address ROM deficits and address strength deficits to attain remaining goals. [x]  See Plan of Care 
[]  See progress note/recertification 
[]  See Discharge Summary Progress towards goals / Updated goals: 
Short Term Goals: To be accomplished in 1 weeks: 1. Pt will be independent & compliant with mild complexity HEP in order to demo progress towards long term PT goals.  
Met (1/25/19) Long Term Goals: To be accomplished in 4 weeks: 1. Pt will be independent & compliant with moderate complexity HEP in order to sustain gains made in PT.  
2. Pt will demo >/= 4+/5 bilateral hip strength in all planes in order to demo progression towards PLOF. 3. Pt FOTO score will increase by >/= 15 points in order to demo increase in function improvement. 4. Pt will report pain does not exceed 3/10 in order to improve overall QOL. Goal met - pt reports pain remains lower than 2/10 1/28/19. 
5. Pt improve plank on elbow time to 30 seconds in order to demo improved core stability for improved biomechanics of l/s and prophylactic to further musculoskeletal dysfunction. 6. Pt will demo proper lifting mechanics with 50# box on 100% of trials in order to return to professional duties with decreased risk for injury.   
Progressing. Pt is able to lift 15# box with good technique. 1/31/19 PLAN 
[]  Upgrade activities as tolerated     [x]  Continue plan of care 
[]  Update interventions per flow sheet      
[]  Discharge due to:_ 
[]  Other:_ Mirtha Morgan PTA 1/31/2019  9:26 AM 
 
Future Appointments Date Time Provider Bibi Bell 1/31/2019  9:30 AM Stef Stern PTA [facility]  
2/4/2019  1:00 PM Stef Stern PTA [facility]  
2/6/2019 10:00 AM Stef Stern PTA [facility]  
2/11/2019 10:00 AM Jan Schreiber, PT [facility]  
2/13/2019  9:30 AM Stacie Huynh, PT [facility]

## 2019-02-04 ENCOUNTER — HOSPITAL ENCOUNTER (OUTPATIENT)
Dept: PHYSICAL THERAPY | Age: 34
Discharge: HOME | End: 2019-02-04
Payer: COMMERCIAL

## 2019-02-04 PROCEDURE — 97110 THERAPEUTIC EXERCISES: CPT

## 2019-02-04 NOTE — PROGRESS NOTES
PT DAILY TREATMENT NOTE 10-18 Patient Name: Leland Parker 
Date:2019 : 1985 [x]  Patient  Verified Payor: Pepito Ng / Plan: L[employer] Sewell HMO / Product Type: HMO /   
In time:100  Out time:126 Total Treatment Time (min): 26 Visit #: 6 of 8 Treatment Area: Low back pain [M54.5] SUBJECTIVE Pain Level (0-10 scale): 0/10 Any medication changes, allergies to medications, adverse drug reactions, diagnosis change, or new procedure performed?: [x] No    [] Yes (see summary sheet for update) Subjective functional status/changes:   [] No changes reported Pt stated that she wants to be done in 2 visits OBJECTIVE 26 min Therapeutic Exercise:  [x] See flow sheet :  
Rationale: increase ROM and increase strength to improve the patients ability to increase ease with ADLs With 
 [x] TE 
 [] TA 
 [] neuro 
 [] other: Patient Education: [x] Review HEP [] Progressed/Changed HEP based on:  
[] positioning   [] body mechanics   [] transfers   [] heat/ice application   
[] other:   
 
Other Objective/Functional Measures:  
Had no difficulty with exercises Used good lifting technique with 30# box Pain Level (0-10 scale) post treatment: 0/10 ASSESSMENT/Changes in Function:  
Pt is progressing well toward goals and is to be discharged in 2 visits with updated HEP. Patient will continue to benefit from skilled PT services to modify and progress therapeutic interventions, address functional mobility deficits, address ROM deficits and address strength deficits to attain remaining goals. [x]  See Plan of Care 
[]  See progress note/recertification 
[]  See Discharge Summary Progress towards goals / Updated goals: 
Short Term Goals: To be accomplished in 1 weeks: 1. Pt will be independent & compliant with mild complexity HEP in order to demo progress towards long term PT goals.  
Met (19) Long Term Goals: To be accomplished in 4 weeks: 1. Pt will be independent & compliant with moderate complexity HEP in order to sustain gains made in PT. Goal met. 2/4/19 2. Pt will demo >/= 4+/5 bilateral hip strength in all planes in order to demo progression towards PLOF. 3. Pt FOTO score will increase by >/= 15 points in order to demo increase in function improvement. 4. Pt will report pain does not exceed 3/10 in order to improve overall QOL.  
Goal met - pt reports no pain today. 2/4/19 5. Pt improve plank on elbow time to 30 seconds in order to demo improved core stability for improved biomechanics of l/s and prophylactic to further musculoskeletal dysfunction. Goal met. 2/4/19 6. Pt will demo proper lifting mechanics with 50# box on 100% of trials in order to return to professional duties with decreased risk for injury.   
Progressing. Pt is able to lift 30# box with good technique. 2/4/19 PLAN 
[]  Upgrade activities as tolerated     [x]  Continue plan of care 
[]  Update interventions per flow sheet [x]  Discharge in 2 visits []  Other:_ Fracisco Hunter PTA 2/4/2019  12:55 PM 
 
Future Appointments Date Time Provider Bibi Bell 2/4/2019  1:00 PM Sebastián Martinez PTA [facility]  
2/6/2019 10:00 AM Sebastián Martinez PTA [facility]  
2/11/2019 10:00 AM Nathan Nguyen PT [facility]  
2/13/2019  9:30 AM Sebastián Martinez PTA [facility]

## 2019-02-06 ENCOUNTER — HOSPITAL ENCOUNTER (OUTPATIENT)
Dept: PHYSICAL THERAPY | Age: 34
Discharge: HOME | End: 2019-02-06
Payer: COMMERCIAL

## 2019-02-06 PROCEDURE — 97110 THERAPEUTIC EXERCISES: CPT

## 2019-02-06 NOTE — PROGRESS NOTES
PT DAILY TREATMENT NOTE 10-18 Patient Name: Divine Begum 
Date:2019 : 1985 [x]  Patient  Verified Payor: Charly Benito / Plan: Hector Lange HMO / Product Type: HMO /   
In time:957  Out time:1023 Total Treatment Time (min): 25 Visit #: 7 of 8 Treatment Area: Low back pain [M54.5] SUBJECTIVE Pain Level (0-10 scale): 0/10 Any medication changes, allergies to medications, adverse drug reactions, diagnosis change, or new procedure performed?: [x] No    [] Yes (see summary sheet for update) Subjective functional status/changes:   [] No changes reported Pt stated that she is doing well and has no pain OBJECTIVE 25 min Therapeutic Exercise:  [x] See flow sheet :  
Rationale: increase ROM and increase strength to improve the patients ability to increase ease with ADLs With 
 [x] TE 
 [] TA 
 [] neuro 
 [] other: Patient Education: [x] Review HEP [x] Progressed/Changed HEP based on:  
[] positioning   [] body mechanics   [] transfers   [] heat/ice application   
[] other:   
 
Other Objective/Functional Measures: Pt was issued final HEP today Pain Level (0-10 scale) post treatment: 0/10 ASSESSMENT/Changes in Function:  
Pt is progressing well and is to be discharged next visit Patient will continue to benefit from skilled PT services to modify and progress therapeutic interventions, address functional mobility deficits, address ROM deficits and address strength deficits to attain remaining goals. [x]  See Plan of Care 
[]  See progress note/recertification 
[]  See Discharge Summary Progress towards goals / Updated goals: 
Short Term Goals: To be accomplished in 1 weeks: 1. Pt will be independent & compliant with mild complexity HEP in order to demo progress towards long term PT goals.  
Met (19) Long Term Goals: To be accomplished in 4 weeks: 1. Pt will be independent & compliant with moderate complexity HEP in order to sustain gains made in PT. Goal met. 2/4/19 2. Pt will demo >/= 4+/5 bilateral hip strength in all planes in order to demo progression towards PLOF. 3. Pt FOTO score will increase by >/= 15 points in order to demo increase in function improvement. 4. Pt will report pain does not exceed 3/10 in order to improve overall QOL.  
Goal met - pt reports no pain today. 2/4/19 5. Pt improve plank on elbow time to 30 seconds in order to demo improved core stability for improved biomechanics of l/s and prophylactic to further musculoskeletal dysfunction. Goal met. 2/4/19 6. Pt will demo proper lifting mechanics with 50# box on 100% of trials in order to return to professional duties with decreased risk for injury.   
Progressing. Pt is able to lift 40# box with good technique. 2/6/19 PLAN 
[]  Upgrade activities as tolerated     [x]  Continue plan of care 
[]  Update interventions per flow sheet      
[]  Discharge due to:_ 
[]  Other:_ Gil Perez PTA 2/6/2019  10:01 AM 
 
Future Appointments Date Time Provider Bibi Bell 2/11/2019 10:00 AM Michelle Merida, PT [facility]  
2/13/2019  9:30 AM Rachid Hurst PTA [facility]

## 2019-02-11 ENCOUNTER — HOSPITAL ENCOUNTER (OUTPATIENT)
Dept: PHYSICAL THERAPY | Age: 34
Discharge: HOME | End: 2019-02-11
Payer: COMMERCIAL

## 2019-02-11 PROCEDURE — 97110 THERAPEUTIC EXERCISES: CPT

## 2019-02-11 NOTE — PROGRESS NOTES
[facility] [address] [facility] 
[phone] fax Physical Therapy Discharge Summary Patient name: Kelsi Hollis Start of Care:  19 Referral source: Arnol Mcbride MD : 1985 Medical/Treatment Diagnosis: Low back pain [M54.5] Payor: [employer] / Plan: [address] HMO / Product Type: HMO /  Onset Date: 19 Prior Hospitalization: see medical history Provider#: [id number] Medications: Verified on Patient Summary List   
Comorbidities:  none Prior Level of Function:  Ind & pain free with all activities Visits from Start of Care: 8    Missed Visits: 0 Reporting Period : 19 to 19 Summary of Care: 
Goal:  Pt will be independent & compliant with moderate complexity HEP in order to sustain gains made in PT. Status at last note/certification: n/a Status at discharge: met Goal:  Pt will demo >/= 4+/5 bilateral hip strength in all planes in order to demo progression towards PLOF. Status at last note/certification: 4/5 Status at discharge: not met Goal: Pt FOTO score will increase by >/= 15 points in order to demo increase in function improvement. Status at last note/certification:  72 Status at discharge: met Goal: Pt will report pain does not exceed 3/10 in order to improve overall QOL. Status at last note/certification: pain fluctuates Status at discharge: met Goal: Pt improve plank on elbow time to 30 seconds in order to demo improved core stability for improved biomechanics of l/s and prophylactic to further musculoskeletal dysfunction. Status at last note/certification: unable Status at discharge: met Goal: Pt will demo proper lifting mechanics with 50# box on 100% of trials in order to return to professional duties with decreased risk for injury. Status at last note/certification: unable Status at discharge: met Pt. Progressed well with physical therapy. She reports no back pain over the past week and demonstrates a good understanding of her HEP to continuing with following D/C. She was able to lift a 50# box with good form and no increase in pain. She also improved her plank time to over 30 seconds with good form. Pt. Continues to demonstrate decreased B hip extension and adduction strength at 4/5, but flexion and abduction strength improved to 4+/5 bilaterally. FOTO score improved to 94 points indicating a significant improvement in function. ASSESSMENT/RECOMMENDATIONS: 
[x]Discontinue therapy: [x]Patient has reached or is progressing toward set goals []Patient is non-compliant or has abdicated 
    []Due to lack of appreciable progress towards set goals Ana Jeff, PT 2/11/2019 10:26 AM

## 2019-02-11 NOTE — PROGRESS NOTES
PT DAILY TREATMENT NOTE 10-18 Patient Name: Katherine Garcia 
Date:2019 : 1985 [x]  Patient  Verified Payor: Vaishali Villanueva / Plan: [employer] / Product Type: HMO /   
In time: 9:56  Out time: 10:28 Total Treatment Time (min): 32 Visit #: 8 of 8 Treatment Area: Low back pain [M54.5] SUBJECTIVE Pain Level (0-10 scale):  0/10 Any medication changes, allergies to medications, adverse drug reactions, diagnosis change, or new procedure performed?: [x] No    [] Yes (see summary sheet for update) Subjective functional status/changes:   [] No changes reported Pt. Reports she is still doing pretty good and is ready for D/C today. OBJECTIVE 32 min Therapeutic Exercise:  [x] See flow sheet :  
Rationale: increase ROM and increase strength to improve the patients ability to increase ease of ADLs With 
 [x] TE 
 [] TA 
 [] neuro 
 [] other: Patient Education: [x] Review HEP [] Progressed/Changed HEP based on:  
[] positioning   [] body mechanics   [] transfers   [] heat/ice application   
[] other:   
 
Other Objective/Functional Measures: FOTO: 94 
50# box lift: 3x with good form Hip MMT flex: right: 4+/5 left: 4+/5 abd: right: 4+/5 left:4+/5  add right: 4/5 left: 4/5 ext: right: 4/5 left: 4/5 Pt. Tolerated PT well with no reports of increased pain Pain Level (0-10 scale) post treatment:  0/10 ASSESSMENT/Changes in Function:  
  
[]  See Plan of Care 
[]  See progress note/recertification 
[x]  See Discharge Summary Progress towards goals / Updated goals: 
See D/C note PLAN 
[]  Upgrade activities as tolerated     []  Continue plan of care 
[]  Update interventions per flow sheet [x]  Discharge due to:_ progress towards goals. []  Other:_ Jose Beard, PT 2019  10:04 AM 
 
Future Appointments Date Time Provider Bibi Bell 2019  9:30 AM Roselia Begum PTA [facility]

## 2019-02-13 ENCOUNTER — HOSPITAL ENCOUNTER (OUTPATIENT)
Dept: PHYSICAL THERAPY | Age: 34
End: 2019-02-13
Payer: COMMERCIAL

## 2021-01-19 ENCOUNTER — HOSPITAL ENCOUNTER (OUTPATIENT)
Dept: GENERAL RADIOLOGY | Age: 36
Discharge: HOME | End: 2021-01-19
Payer: COMMERCIAL

## 2021-01-19 DIAGNOSIS — M25.562 LEFT KNEE PAIN: ICD-10-CM

## 2021-01-19 PROCEDURE — 73562 X-RAY EXAM OF KNEE 3: CPT

## 2021-02-08 ENCOUNTER — HOSPITAL ENCOUNTER (OUTPATIENT)
Dept: PHYSICAL THERAPY | Age: 36
Discharge: HOME | End: 2021-02-08
Payer: COMMERCIAL

## 2021-02-08 PROCEDURE — 97530 THERAPEUTIC ACTIVITIES: CPT

## 2021-02-08 PROCEDURE — 97161 PT EVAL LOW COMPLEX 20 MIN: CPT

## 2021-02-08 PROCEDURE — 97035 APP MDLTY 1+ULTRASOUND EA 15: CPT

## 2021-02-08 PROCEDURE — 97165 OT EVAL LOW COMPLEX 30 MIN: CPT

## 2021-02-08 NOTE — PROGRESS NOTES
[facility]  [employer] OF [address]  [phone] fax    Plan of Care/Statement of Necessity for Occupational Therapy Services    Patient name: Sergo Castle Start of Care: 2021   Referral source: KERON Ordonez : 1985    Medical Diagnosis: Right wrist pain [M25.531]  Right hand pain [M79.641]  Payor: BLUE CROSS / Plan: [address] [place] [address] / Product Type: PPO /  Onset Date:    Treatment Diagnosis: Pain, right wrist forearm, hand   Prior Hospitalization: see medical history Provider#: [id number]   Medications: Verified on Patient summary List    Comorbidities: none   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 Assessment/ key information: Objective/Functional Measures:   Subjective: pt is a right hand dominant, 35 y.o.y/o, female who has had 3 year history of pain  in right hand /wrist  that occurs following overusing, with numbness in all digits at night more often after a busy day. She reports sudden sharp pain which occurs when lifting items at work and locates pain on palpation to dorsal forearm to wrist. She has a wrist brace which she uses during work and which seems to reduce symptoms. Her pain on day of eval is 0/10. Her AROM and strength are both WNL. Pain is noted with palpation to mid dorsal forearm. She reports difficulty with lifting opening jars and containers, peeling foods. Her FOTO is 74/100 reflecting slight impairment in function. She will benefit from skilled occupational therapy to reduce pain and numbness and improve functional use of right UE during work and home care tasks.       Evaluation Complexity: History LOW Complexity : Brief history review  Examination LOW Complexity : 1-3 performance deficits relating to physical, cognitive , or psychosocial skils that result in activity limitations and / or participation restrictions  Clinical Decision Making LOW Complexity : No comorbidities that affect functional and no verbal or physical assistance needed to complete eval tasks   Overall Complexity Rating: LOW     Problem List: Pain effecting function, Decreased ADL/functional abilities , Decreased activity tolerance and Sensability     Treatment Plan may include any combination of the following: Therapeutic exercise, Therapeutic activities, Physical agent/modality, Manual therapy, Patient education and ADLs/IADLs    Patient / Family readiness to learn indicated by: asking questions, trying to perform skills and interest    Persons(s) to be included in education:   patient (P)    Barriers to Learning/Limitations: None    Patient Goal (s): Understand how to better deal with symptoms    Patient Self Reported Health Status: good    Rehabilitation Potential: excellent    Short Term Goals: To be accomplished in 2 weeks:  1. Patient will be independent in home exercise program for general UE stretching and nerve gliding. 2.  Patient will be familiar with pain management strategies to use during work and home care to reduce symptoms. Long Term Goals: To be accomplished in 4 weeks:   1. Patient will report at least 75%  fewer episodes of pain at work with use of   Strategies. 2.  Patient will report at least 75% fewer episodes of nighttime numbness due to improved sleep positioning and exercises. 3.  Patient will be able to incorporate adaptive strategies to allow her to engage in desired fitness routines without increase in symptoms.       Frequency / Duration: Patient to be seen 2 times per week for 4 weeks:    Patient/ Caregiver education and instruction: Diagnosis, prognosis, self care, activity modification, brace/ splint application and exercises   [x]  Plan of care has been reviewed with Pina Gayle Claudio Rivas, OTR/L 2/8/2021 4:43 PM    _____________________________________________________________________    I certify that the above Therapy Services are being furnished while the patient is under my care. I agree with the treatment plan and certify that this therapy is necessary.     Physician's Signature:____________Date:_________TIME:________    ** Signature, Date and Time must be completed for valid certification **    Please sign and return to [facility]  [facility] COMPANY OF [facility] LARISSA   [address] [place] [address]  [phone] fax

## 2021-02-08 NOTE — PROGRESS NOTES
PT DAILY TREATMENT NOTE/KNEE EVAL     Patient Name: Tk Rosado  Date:2021  : 1985  [x]  Patient  Verified  Payor: Manasa Pink / Plan: [address] [place] [id number] / Product Type: PPO /    In time:3:45  Out time:4:30  Total Treatment Time (min): 45  Visit #: 1 of 8    Medicare/BCBS Only   Total Timed Codes (min):  20 1:1 Treatment Time:  45     Treatment Area: Left knee pain [M25.562]    SUBJECTIVE  Pain Level (0-10 scale): 0/10 at rest, 8/10 when running down hill.   []constant []intermittent []improving []worsening []no change since onset    Any medication changes, allergies to medications, adverse drug reactions, diagnosis change, or new procedure performed?: [x] No    [] Yes (see summary sheet for update)  Subjective functional status/changes:       Pt is a 29 y/o female who presents with left anterior knee pain when going down ladders and running down hill. Pt reported her knee pain has been going on since last July. Had imaging but nothing shown besides increased fluid in the knee. Pt typical run was running between 1-3 miles at the time of injury. Pt was increasing volume of running at the time. No other means of exercise. Pt was running everyday at that point. Pt tried to power through it but would be flared up the folliwing day, even with walking. Pt works at [employer] and body works, lots of lifting and climbing ladders, increases pain when coming down the ladders. When lifitng boxes pt feels small discomfort. Pt decided to seek more help after trying to run again but having same knee pain. Pt has started doing yoga recently. Pt most recent flair up was yesterday. Sitting on table 0/10, when lifting shot up to a 8/10. No sig. PMH. Has help at home from children. Pt reports she does warm up with static stretching.        Pt Goals: Santo Chapa is my knee pain happening, and what to do to prevent\"     OBJECTIVE/EXAMINATION    25 min [x]Eval                  []Re-Eval     20 min Therapeutic Activity: []  See flow sheet : pt education, HEP    Rationale: increase strength and improve coordination  to improve the patients ability to carry out ADL's          With   [] TE   [x] TA   [] neuro   [] other: Patient Education: [x] Review HEP    [] Progressed/Changed HEP based on:   [x] positioning   [x] body mechanics   [] transfers   [] heat/ice application    [] other:      Other Objective/Functional Measures:     Physical Therapy Evaluation - Knee    Posture: [] Varus    [] Valgus    [] Recurvatum        [] Tibial Torsion    [] Foot Supination    [] Foot Pronation    Describe:    Gait:  [x] Normal    [] Abnormal    [] Antalgic    [] NWB    Device:    Describe:    ROM / Strength   [] Unable to assess                  AROM                      PROM                   Strength (1-5)    Left Right Left Right Left Right   Hip Flexion [facility] WFL   5/5 5/5    Extension [facility] WFL   5/5 5/5    Abduction [facility] WFL   5/5 5/5    Adduction [facility] WFL   5/5 5/5   Knee Flexion [facility] WFL   5/5 5/5    Extension [facility] WFL   5/5 5/5   Ankle Plantarflexion WFL WFL   5/5 5/5    Dorsiflexion WFL WFL   5/5 5/5     38* on the right, 30* on the left.  IR  20* on the right, 28* on the left ER    Flexibility: [] Unable to assess at this time  Hamstrings: 50* from neutral bilateral.    (L) Tightness= [] WNL   [] Min   [x] Mod   [] Severe    (R) Tightness= [] WNL   [] Min   [x] Mod   [] Severe  Quadriceps:    (L) Tightness= [x] WNL   [] Min   [] Mod   [] Severe    (R) Tightness= [x] WNL   [] Min   [] Mod   [] Severe  Gastroc:      (L) Tightness= [] WNL   [] Min   [] Mod   [] Severe    (R) Tightness= [] WNL   [] Min   [] Mod   [] Severe  Other:    Palpation:   Neg/Pos  Neg/Pos  Neg/Pos   Joint Line  Quad tendon  Patellar ligament    Patella neg Fibular head  Pes Anserinus    Tibial tubercle  Hamstring tendons  Infrapatellar fat pad      Optional Tests:  Patellar Positioning (Static)   [x]L [x]R Normal []L []R Lateral   []L []R Sharita Genera      []L []R Medial   []L []R Baltazar    Patellar Tracking   []L []R Glide (Lat)   []L []R Tilt (Lat)     []L []R Glide (Med)  []L []R Tilt (Med)      []L []R Tile (Inf)     Patellar Mobility   []L []R Hypermobile []L []R Hypomobile           Lachmans  [x] Neg    [] Pos Posterior Drawer [x] Neg    [] Pos  Pivot Shift  [] Neg    [] Pos Posterior Sag  [] Neg    [] Pos  SABINE   [] Neg    [] Pos Janak's Test [] Neg    [] Pos  ALRI   [] Neg    [] Pos Squat   [] Neg    [x] Pos  Valgus@ 0 Degrees [x] Neg    [] Pos Tamara-Neal [x] Neg    [] Pos  Valgus@ 30 Degrees [x] Neg    [] Pos Patellar Apprehension [] Neg    [] Pos  Varus@ 0 Degrees [x] Neg    [] Pos Ford's Compression [] Neg    [] Pos  Varus@ 30 Degrees [x] Neg    [] Pos Ely's Test  [x] Neg    [] Pos  Apley's Compression [x] Neg    [] Pos Mary Anne's Test  [] Neg    [] Pos  Apley's Distraction [x] Neg    [] Pos Stroke Test  [] Neg    [] Pos   Anterior Drawer [x] Neg    [] Pos Fluctuation Test [] Neg    [] Pos  Other:                  [] Neg    [] Pos                 Other tests/comments:    Leg length NEGATIVE   Running observation on treadmill negative for abnormalities   Stepping down from large plyo box increases anterior knee pain     30 SLS 30 sec bilaterally.         Pain Level (0-10 scale) post treatment: 1/10    ASSESSMENT/Changes in Function:   [x]  See Plan of Care  []  See progress note/recertification  []  See Discharge Summary         Progress towards goals / Updated goals:  SEE POC    PLAN  []  Upgrade activities as tolerated     [x]  Continue plan of care  []  Update interventions per flow sheet       []  Discharge due to:_  []  Other:_      Grace Walton 2/8/2021  3:45 PM

## 2021-02-08 NOTE — PROGRESS NOTES
[facility]  [employer] OF [facility] LARISSA  [address] [place] [address]  [phone] fax    Plan of Care/ Statement of Necessity for Physical Therapy Services    Patient name: Neelam Kaplan Start of Care: 2021   Referral source: KERON Bolaños : 1985    Medical Diagnosis: Left knee pain [M25.562]  Payor: GIVTED / Plan: [address] [place] [address] / Product Type: PPO /  Onset Date:    Treatment Diagnosis: Left knee pain, abnormalities of gait and mobility. Prior Hospitalization: see medical history Provider#: [id number]   Medications: Verified on Patient summary List    Comorbidities: Alcohol use. Prior Level of Function: independent, running 4-7 days a week 1-3 miles. The Plan of Care and following information is based on the information from the initial evaluation. Assessment/ key information:  Pt is a 29 y/o female with complaints of anterior knee pain when running, specifically downhill. She also has trouble with descending ladders at work. Pt reports this pain started in 2020 and has been persistent since. Pt tried to stop running and just rest the knee, however in  she tried to start running again and the knee pain returned. Upon examination pt shows great isolated strength in MMT positions and the only impairment with ROM was tight hip rotation and hamstring tightness. When looking at the pt squat and single leg squat, weakness and instability was noted with valgus motion and LOB when ascending. Pt single leg stance was [facility]. Pt was observed running on a Treadmill at comfortable pace and no abnormal or significant impairments were noted. When asked to step down from the high plyo box the pt pain was increased and was worse on a fast descend showing the pt tendon/muscle tissue was not able to handle increased force as would happen when running [place].  Pt FOTO score was 61 showing this impairment is having a moderate impact on her life day to day. Pt will benefit from skilled physical therapy services to increase strength in her LE and ROM in her hip rotation/hamstring to allow patient to return to work related duties as well as running as her activity of choice to improve her quality of life. Evaluation Complexity History MEDIUM  Complexity : 1-2 comorbidities / personal factors will impact the outcome/ POC ; Examination MEDIUM Complexity : 3 Standardized tests and measures addressing body structure, function, activity limitation and / or participation in recreation  ;Presentation LOW Complexity : Stable, uncomplicated  ;Clinical Decision Making MEDIUM Complexity : FOTO score of 26-74  Overall Complexity Rating: LOW   Problem List: pain affecting function, decrease ROM, decrease strength, decrease ADL/ functional abilitiies and decrease activity tolerance   Treatment Plan may include any combination of the following: Therapeutic exercise, Therapeutic activities, Neuromuscular re-education, Physical agent/modality, Gait/balance training and Functional mobility training  Patient / Family readiness to learn indicated by: asking questions and trying to perform skills  Persons(s) to be included in education: patient (P)  Barriers to Learning/Limitations: None  Patient Goal (s): decrease knee pain, get back to running  Patient Self Reported Health Status: good  Rehabilitation Potential: good    Short Term Goals: To be accomplished in 1 weeks:   1. Pt will be 100% compliant with HEP to increase ability to participate in activities of choice. Long Term Goals: To be accomplished in 4 weeks:   1. Pt will increase FOTO score by 16 to show significant increase to show improvement in quality of life. 2. Pt will increase IR of L hip to 38* to match uninvolved side to allow improved quality of motion when running.    3. Pt will report less then 2/10 anterior knee pain while ascending/descending ladders to allow pt to return to full work related duties. 4. Pt will be able to run 1 mile with no reports of knee pain to allow patient to participate in activities of choice. Frequency / Duration: Patient to be seen 2 times per week for 4 weeks. Patient/ Caregiver education and instruction: Diagnosis, prognosis, self care   [x]  Plan of care has been reviewed with JOVANNI Robles 2/8/2021 5:29 PM    ________________________________________________________________________    I certify that the above Therapy Services are being furnished while the patient is under my care. I agree with the treatment plan and certify that this therapy is necessary.     Physician's Signature:____________Date:_________TIME:________    ** Signature, Date and Time must be completed for valid certification **    Please sign and return to [facility]  [facility] [employer] [facility] Alex LARISSA  [address] [place] [address]  [phone] fax

## 2021-02-08 NOTE — PROGRESS NOTES
OT DAILY TREATMENT NOTE     Patient Name: Zara José  Date:2021  : 1985  [x]  Patient  Verified  Payor: BLUE CROSS / Plan: [address] [place] [address] / Product Type: PPO /    In time:305  Out time:345  Total Treatment Time (min): 40  Visit #: 1 of 8    Medicare/BCBS Only   Total Timed Codes (min):  8 1:1 Treatment Time:  40     Treatment Area: Acute carpal tunnel syndrome of right wrist [G56.01]    SUBJECTIVE  Pain Level (0-10 scale): 0/10  Any medication changes, allergies to medications, adverse drug reactions, diagnosis change, or new procedure performed?: [x] No    [] Yes (see summary sheet for update)  Subjective functional status/changes:   [] No changes reported  Whole hand numb at night sometimes  Pain here ( dorsal forearm)    OBJECTIVE    Modality rationale: decrease pain and increase tissue extensibility to improve the patients ability to grasp   Min Type Additional Details    [] Estim:  []Unatt       []IFC  []Premod                        []Other:  []w/ice   []w/heat  Position:  Location:    [] Estim: []Att    []TENS instruct  []NMES                    []Other:  []w/US   []w/ice   []w/heat  Position:  Location:    []  Traction: [] Cervical       []Lumbar                       [] Prone          []Supine                       []Intermittent   []Continuous Lbs:  [] before manual  [] after manual   8 [x]  Ultrasound: []Continuous   [x] Pulsed                           []1MHz   [x]3MHz W/cm2:1.0  Location:mid dorsal forearm    []  Iontophoresis with dexamethasone         Location: [] Take home patch   [] In clinic    []  Ice     []  heat  []  Ice massage  []  Laser   []  Paraffin Position:  Location:    []  Laser with stim  []  Other:  Position:  Location:    []  Vasopneumatic Device Pressure:       [] lo [] med [] hi   Temperature: [] lo [] med [] hi       [x] Skin assessment post-treatment:  [x]intact []redness- no adverse reaction    []redness  adverse reaction:     32 min [x]Eval []Re-Eval                  With   [] TE   [] TA   [] neuro   [] other: Patient Education: [x] Review HEP    [] Progressed/Changed HEP based on:  OT POC, nerve supplies in forearm  [] positioning   [] body mechanics   [] transfers   [] heat/ice application   [] Splint wear/care   [] Sensory re-education   [] scar management      [] other:            Other Objective/Functional Measures:   Subjective: pt is a right hand dominant, 30 y.o.y/o, female who has had 3 year history of pain numbness in right hand in all digits  That occurs when overusing, numbness at night more often after a busy day  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        Objective:    Sensation:Not tested as symptoms not present at all times  Range of Motion:     Active      Norms Right Left                                                                                      Wrist Flex 0-80 70 NT      Ext 0-70 75 NT      Ulnar Dev 0-30 40 NT      Radial Dev 0-20 25 NT       Strength:    Right Left                                                         Wrist Flex 5/5 NT    Ext 5/5 NT    Ulnar Dev 5/5 NT    Radial Dev 5/5 NT     Hand ROM WNL  Hand Strength:   Gross Grasp 3pt Pinch Lateral Pinch Tip Pinch   Right  62 16 15 12   Left 65 15 13 11       Finger Opposition:WNL to tips and base    Palpation: Tender mid dorsal forearm    ADLs  Feeding: []MaxA   []ModA   []Yassine   [] CGA   []SBA   []Tahmina   [x]Independent  UE Dressing:       []MaxA   []ModA   []Yassine   [] CGA   []SBA   []Tahmina   [x]Independent  LE Dressing:       []MaxA   []ModA   []Yassine   [] CGA   []SBA   []Tahmina   [x]Independent  Grooming:       []MaxA   []ModA   []Yassine   [] CGA   []SBA   []Tahmina   [x]Independent  Toileting:       []MaxA   []ModA   []Yassine   [] CGA   []SBA   []Tahmina   [x]Independent  Bathing:       []MaxA   []ModA   []Yassine   [] CGA   []SBA   []Tahmina   [x]Independent  Light Meal Prep:    []MaxA   [x]ModA   []Yassine   [] CGA   []SBA   []Tahmina   []Independent  Household/Other: []MaxA   []ModA   [x]Yassine   [] CGA   []SBA   []Tahmina   []Independent  Adaptive Equip:     []MaxA   []ModA   []Yassine   [] CGA   []SBA   []Tahmina   []Independent  Driving:       []MaxA   []ModA   []Yassine   [] CGA   []SBA   []Tahmina   [x]Independent  Money Mgmt:        []MaxA   []ModA   []Yassine   [] CGA   []SBA   []Tahmina   [x]Independent       Pain Level (0-10 scale) post treatment: 0/10    ASSESSMENT/Changes in Function:    []  See Plan of Care  []  See progress note/recertification  []  See Discharge Summary           PLAN  []  Upgrade activities as tolerated     []  Continue plan of care  []  Update interventions per flow sheet       []  Discharge due to:_  []  Other:_      Laila Chime, OTR/L 2/8/2021  3:03 PM    Future Appointments   Date Time Provider Bibi Bell   2/8/2021  3:45 PM Amilcar Apt, PT [facility]

## 2021-02-11 ENCOUNTER — HOSPITAL ENCOUNTER (OUTPATIENT)
Dept: PHYSICAL THERAPY | Age: 36
Discharge: HOME | End: 2021-02-11
Payer: COMMERCIAL

## 2021-02-11 PROCEDURE — 97140 MANUAL THERAPY 1/> REGIONS: CPT

## 2021-02-11 PROCEDURE — 97035 APP MDLTY 1+ULTRASOUND EA 15: CPT

## 2021-02-11 PROCEDURE — 97110 THERAPEUTIC EXERCISES: CPT

## 2021-02-11 NOTE — PROGRESS NOTES
OT DAILY TREATMENT NOTE     Patient Name: Francisco Jacinto  Date:2021  : 1985  [x]  Patient  Verified  Payor: BLUE [employer]OSS / Plan: [address] [place] [address] / Product Type: PPO /    In time:5:10  Out time:5:50  Total Treatment Time (min): 40  Visit #: 2 of 8    Medicare/BCBS Only   Total Timed Codes (min):  40 1:1 Treatment Time:  40     Treatment Area: Right wrist pain [M25.531]  Right hand pain [M79.641]    SUBJECTIVE  Pain Level (0-10 scale): 0/10  Any medication changes, allergies to medications, adverse drug reactions, diagnosis change, or new procedure performed?: [x] No    [] Yes (see summary sheet for update)  Subjective functional status/changes:   [] No changes reports  Reports not being in pain now but when at work will feel pain at end of day.   Reports will wear splint to work but she will take it off because it is constricting     OBJECTIVE    Modality rationale: decrease pain and increase tissue extensibility to improve the patients ability to grasp   Min Type Additional Details    [] Estim:  []Unatt       []IFC  []Premod                        []Other:  []w/ice   []w/heat  Position:  Location:    [] Estim: []Att    []TENS instruct  []NMES                    []Other:  []w/US   []w/ice   []w/heat  Position:  Location:    []  Traction: [] Cervical       []Lumbar                       [] Prone          []Supine                       []Intermittent   []Continuous Lbs:  [] before manual  [] after manual   8 [x]  Ultrasound: []Continuous   [x] Pulsed                 50%          []1MHz   [x]3MHz W/cm2:  Location:right mid dorsal forearm    []  Iontophoresis with dexamethasone         Location: [] Take home patch   [] In clinic    []  Ice     []  heat  []  Ice massage  []  Laser   []  Paraffin Position:  Location:    []  Laser with stim  []  Other:  Position:  Location:    []  Vasopneumatic Device Pressure:       [] lo [] med [] hi   Temperature: [] lo [] med [] hi       [x] Skin assessment post-treatment:  [x]intact []redness- no adverse reaction    []redness  adverse reaction:       22 min Therapeutic Exercise:  [] See flow sheet :   Rationale: increase ROM and increase strength to improve the patients ability to lift, , reach and lift    Scalene stretch  Pectoralis stretch  Scapular squeeze  Arm slide on wall  thoracic extension  Radial nerve glides with right UE    10 min Manual Therapy:  IASTM   The manual therapy interventions were performed at a separate and distinct time from the therapeutic activities interventions. Rationale: decrease pain and increase tissue extensibility to improve the ability to use right UE    IASTM to Right Mid dorsal forearm    With   [x] TE   [] TA   [] neuro   [x] other: Patient Education: [x] Review HEP    [] Progressed/Changed HEP based on:  Initiated radial nerve glides, and stretching exercise HEP  [] positioning   [] body mechanics   [] transfers   [x] heat/ice application   [] Splint wear/care   [] Sensory re-education   [] scar management      [x] other: educated on heat and ice modalities and self masseage           Other Objective/Functional Measures:   Able to perform tasks with no difficulty       Pain Level (0-10 scale) post treatment: 1/10    ASSESSMENT/Changes in Function:   When performing IASTM pt had multiple knots      Patient will continue to benefit from skilled OT services to modify and progress therapeutic interventions and instruct in home and community integration to attain remaining goals. [x]  See Plan of Care  []  See progress note/recertification  []  See Discharge Summary         Progress towards goals / Updated goals:  Short Term Goals: To be accomplished in 2 weeks:  1. Patient will be independent in home exercise program for general UE stretching and nerve gliding.  2/11/21: initiated radial nerve glide HEP and stretching exercises    2.   Patient will be familiar with pain management strategies to use during work and home care to reduce symptoms. 2/11/21: educated on heat and ice modalities as well as stretching exercises     Long Term Goals: To be accomplished in 4 weeks:          1. Patient will report at least 75%  fewer episodes of pain at work with use of   Strategies. 2.  Patient will report at least 75% fewer episodes of nighttime numbness due to improved sleep positioning and exercises. 3.  Patient will be able to incorporate adaptive strategies to allow her to engage in desired fitness routines without increase in symptoms.           PLAN  []  Upgrade activities as tolerated     [x]  Continue plan of care  []  Update interventions per flow sheet       []  Discharge due to:_  []  Other:_      Katt Camacho 2/11/2021  10:27 AM    Future Appointments   Date Time Provider Bibi Bell   2/11/2021  5:15 PM Chucky Adkins, OTR/L MMCPTPB [facility]   2/16/2021  1:30 PM Darrion PAGE [facility]   2/16/2021  2:15 PM Pelon Reynoso [id number] [facility]   2/18/2021  3:00 PM Pelon Reynoso [id number] [facility]   2/18/2021  3:45 PM Media Carloz, PT MMCPTPB [facility]   2/22/2021  2:15 PM Chucky Adkins, OTR/L [facility]CPTPB [facility]   2/22/2021  3:00 PM MAIRA SpencerT TPCICYB [facility]   2/25/2021  3:00 PM Media Carloz, PT MMCPTPB [facility]   2/25/2021  3:45 PM Chucky Adkins, OTR/L MMCPTPB [facility]

## 2021-02-11 NOTE — PROGRESS NOTES
PT DAILY TREATMENT NOTE     Patient Name: Karson Valdez  Date:2021  : 1985  [x]  Patient  Verified  Payor: [employer] [place] / Plan: [address] [place] [address] / Product Type: PPO /    In time: 9:45  Out time: 10:18  Total Treatment Time (min): 33  Visit #: 2 of 8    Medicare/BCBS Only   Total Timed Codes (min):  33 1:1 Treatment Time:  33       Treatment Area: Left knee pain [M25.562]    SUBJECTIVE  Pain Level (0-10 scale): 0  Any medication changes, allergies to medications, adverse drug reactions, diagnosis change, or new procedure performed?: [x] No    [] Yes (see summary sheet for update)  Subjective functional status/changes:   [] No changes reported  \"I'm doing good. 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 OBJECTIVE    33 min Therapeutic Exercise:  [x] See flow sheet :   Rationale: increase ROM and increase strength to improve the patients ability to perform ADLs with ease          With   [x] TE   [x] TA   [] neuro   [] other: Patient Education: [x] Review HEP    [] Progressed/Changed HEP based on:   [x] positioning   [x] body mechanics   [] transfers   [] heat/ice application    [] other:      Other Objective/Functional Measures:   Initiated POC  Completed 12\" box step ups (some discomfort towards end); 18\" increases knee pain at last visit  Sheyla with QS   HR/TR completed in parallel bars and on edge of step  Band walks with OTB in lateral and fwd/retro    Pain Level (0-10 scale) post treatment: 1    ASSESSMENT/Changes in Function: Patient completed initial session with great tolerance for exercises. She demonstrates good follow through with  for form and describes mild discomfort with eccentric quad weight bearing activities that dissipates upon completion. Her main complaint is descending the ladder at work numerous times a day.      Patient will continue to benefit from skilled PT services to modify and progress therapeutic interventions, address functional mobility deficits, address ROM deficits, address strength deficits, analyze and address soft tissue restrictions, analyze and cue movement patterns, analyze and modify body mechanics/ergonomics and assess and modify postural abnormalities to attain remaining goals. [x]  See Plan of Care  []  See progress note/recertification  []  See Discharge Summary         Progress towards goals / Updated goals:  Short Term Goals: To be accomplished in 1 weeks:              1. Pt will be 100% compliant with HEP to increase ability to participate in activities of choice. Long Term Goals: To be accomplished in 4 weeks:              1. Pt will increase FOTO score by 16 to show significant increase to show improvement in quality of life. 2. Pt will increase IR of L hip to 38* to match uninvolved side to allow improved quality of motion when running. 3. Pt will report less then 2/10 anterior knee pain while ascending/descending ladders to allow pt to return to full work related duties. 4. Pt will be able to run 1 mile with no reports of knee pain to allow patient to participate in activities of choice.      PLAN  [x]  Upgrade activities as tolerated     [x]  Continue plan of care  []  Update interventions per flow sheet       []  Discharge due to:_  []  Other:_      CALLIE Arriaza 2/11/2021  9:47 AM    Future Appointments   Date Time Provider Bibi Bell   2/11/2021  5:15 PM Chucky Adkins, OTR/L MMCPTPB [address]   2/16/2021  1:30 PM Darrion CARBALLODTC [address]   2/16/2021  2:15 PM Pelon Reynoso SHKOYYU [address]   2/18/2021  3:00 PM Pelon Reynoso AXMQGCQ [address]   2/18/2021  3:45 PM Media Carloz, PT GYVAJRF [address]   2/22/2021  2:15 PM Chucky Adkins, OTR/L MMCPTPB [address]   2/22/2021  3:00 PM Kee Casas DPT [facility]CPTP[facility] [address]   2/25/2021  3:00 PM Media Carloz, PT MMCPTPB [address]   2/25/2021  3:45 PM Marianna Harada, OTR/L [facility]CPTPB [facility]

## 2021-02-16 ENCOUNTER — HOSPITAL ENCOUNTER (OUTPATIENT)
Dept: PHYSICAL THERAPY | Age: 36
Discharge: HOME | End: 2021-02-16
Payer: COMMERCIAL

## 2021-02-16 PROCEDURE — 97140 MANUAL THERAPY 1/> REGIONS: CPT

## 2021-02-16 PROCEDURE — 97110 THERAPEUTIC EXERCISES: CPT

## 2021-02-16 PROCEDURE — 97035 APP MDLTY 1+ULTRASOUND EA 15: CPT

## 2021-02-16 NOTE — PROGRESS NOTES
PT DAILY TREATMENT NOTE     Patient Name: Mary Lux  Date:2021  : 1985  [x]  Patient  Verified  Payor: BLUE [employer]OSS / Plan: [address] [place] [address] / Product Type: PPO /    In time: 1:30  Out time: 2:08  Total Treatment Time (min): 38  Visit #: 3 of 8    Medicare/BCBS Only   Total Timed Codes (min):  38 1:1 Treatment Time:  38       Treatment Area: Left knee pain [M25.562]    SUBJECTIVE  Pain Level (0-10 scale): 0  Any medication changes, allergies to medications, adverse drug reactions, diagnosis change, or new procedure performed?: [x] No    [] Yes (see summary sheet for update)  Subjective functional status/changes:   [] No changes reported  Patient reports she has noticed an improvement with ascending and descending the ladder at work, with decreased pain over the weekend. OBJECTIVE     38 min Therapeutic Exercise:  [x] See flow sheet :   Rationale: increase ROM, increase strength and improve coordination to improve the patients ability to perform ADLs with ease    With   [x] TE   [x] TA   [] neuro   [] other: Patient Education: [x] Review HEP    [] Progressed/Changed HEP based on:   [x] positioning   [x] body mechanics   [] transfers   [] heat/ice application    [] other:      Other Objective/Functional Measures:   VC for form with lateral  Lunges  SLR in prone and bilateral sidelying  Added bridges with hold     Pain Level (0-10 scale) post treatment: 8    ASSESSMENT/Changes in Function: Patient tolerated new exercises well with good follow through for verbal cuing. She reports some discomfort with wall squats in HEP, had pt demonstrate and she is utlizing proper squat form; advised to remain in pain free ranges and discontinue if pain continues to increase. She is progressing well towards goals with less pain experienced with work duties.      Patient will continue to benefit from skilled PT services to modify and progress therapeutic interventions, address functional mobility deficits, address ROM deficits, address strength deficits, analyze and address soft tissue restrictions, analyze and cue movement patterns, analyze and modify body mechanics/ergonomics and assess and modify postural abnormalities to attain remaining goals. [x]  See Plan of Care  []  See progress note/recertification  []  See Discharge Summary         Progress towards goals / Updated goals:  Short Term Goals: To be accomplished in 1 weeks:              1. Pt will be 100% compliant with HEP to increase ability to participate in activities of choice.   [address] be accomplished in 4 weeks:              1. Pt will increase FOTO score by 16 to show significant increase to show improvement in quality of life. 2. Pt will increase IR of L hip to 38* to match uninvolved side to allow improved quality of motion when running. 3. Pt will report less then 2/10 anterior knee pain while ascending/descending ladders to allow pt to return to full work related duties.    4.  Pt will be able to run 1 mile with no reports of knee pain to allow patient to participate in activities of choice.     PLAN  [x]  Upgrade activities as tolerated     [x]  Continue plan of care  []  Update interventions per flow sheet       []  Discharge due to:_  []  Other:_      CALLIE Jackson 2/16/2021  1:36 PM    Future Appointments   Date Time Provider Bibi Bell   2/16/2021  2:15 PM Anibal Gregg VBPLUDU [facility]   2/18/2021  3:00 PM Anibal Gregg [id number] [facility]   2/18/2021  3:45 PM Gigi Otero, IRAM MMCPTPB [facility]   2/22/2021  2:15 PM Colin Staley OTR/L [facility]CPTP[facility]   2/22/2021  3:00 PM Raj Leonard DPT [facility]   2/25/2021  3:00 PM Gigi Otero, PT MMCPTPB [facility]   2/25/2021  3:45 PM Anibal Gregg CVYABWP [facility]

## 2021-02-16 NOTE — PROGRESS NOTES
OT DAILY TREATMENT NOTE 10-18    Patient Name: Sana Barlow  Date:2021  : 1985  [x]  Patient  Verified  Payor: [employer]OSS / Plan: [address] [place] [address] / Product Type: PPO /    In time:208  Out time:247  Total Treatment Time (min): 39  Visit #: 3 of 8    Medicare/BCBS Only   Total Timed Codes (min):  39 1:1 Treatment Time:  39     Treatment Area: Right wrist pain [M25.531]  Right hand pain [M79.641]    SUBJECTIVE  Pain Level (0-10 scale): 0/10  Any medication changes, allergies to medications, adverse drug reactions, diagnosis change, or new procedure performed?: [x] No    [] Yes (see summary sheet for update)  Subjective functional status/changes:   [] No changes reported  Patient reports sleeping on side and often waking up with numbness. OBJECTIVE  Modality rationale: decrease pain and increase tissue extensibility to improve the patients ability to grasp   Min Type Additional Details      []? Estim:  []? Unatt       []? IFC  []? Premod                        []?Other:  []?w/ice   []?w/heat  Position:  Location:      []? Estim: []? Att    []? TENS instruct  []? NMES                    []?Other:  []?w/US   []?w/ice   []?w/heat  Position:  Location:      []? Traction: []? Cervical       []? Lumbar                       []? Prone          []? Supine                       []?Intermittent   []? Continuous Lbs:  []? before manual  []? after manual    8 [x]? Ultrasound: []? Continuous   [x]? Pulsed                 50%          []? 1MHz   [x]? 3MHz W/cm2:  Location:right mid dorsal forearm      []? Iontophoresis with dexamethasone         Location: []? Take home patch   []? In clinic      []? Ice     []?  heat  []? Ice massage  []? Laser   []? Paraffin Position:  Location:      []? Laser with stim  []? Other:  Position:  Location:      []? Vasopneumatic Device Pressure:       []? lo []? med []? hi   Temperature: []? lo []? med []? hi       [x]? Skin assessment post-treatment:  [x]? intact []?redness- no adverse reaction  []? redness  adverse reaction:      21 min Therapeutic Exercise:  [] See flow sheet :   Rationale: increase ROM and increase strength to improve the patients ability to , lift     Radial Nerve Glides- in stand: 5x  Arms on wall   Scalene Stretch  Doorway Stretch  Scap Retraction   Thoracic Extension   Wrist Mazes 5x each     10 min Manual Therapy:  IASTM   The manual therapy interventions were performed at a separate and distinct time from the therapeutic activities interventions. Rationale: decrease pain, increase ROM and increase tissue extensibility to improve ability to grasp,  and lift    IASTM to dorsal forearm     With   [] TE   [] TA   [] neuro   [x] other: Patient Education: [x] Review HEP    [] Progressed/Changed HEP based on:   [] positioning   [] body mechanics   [] transfers   [] heat/ice application   [] Splint wear/care   [] Sensory re-education   [] scar management      [x] other: sleep positioning            Other Objective/Functional Measures:     Cueing with wall slides to keep arms against wall     Pain Level (0-10 scale) post treatment: 0/10    ASSESSMENT/Changes in Function: Tightness remains     Patient will continue to benefit from skilled OT services to modify and progress therapeutic interventions, address ROM deficits, address strength deficits and instruct in home and community integration to attain remaining goals. []  See Plan of Care  []  See progress note/recertification  []  See Discharge Summary         Progress towards goals / Updated goals:  Short Term Goals: To be accomplished in 2 weeks:  1.  Patient will be independent in home exercise program for general UE stretching and nerve gliding.  2/11/21: initiated radial nerve glide HEP and stretching exercises     2.  Patient will be familiar with pain management strategies to use during work and home care to reduce symptoms.   2/11/21: educated on heat and ice modalities as well as stretching exercises      Long Term Goals: To be accomplished in 4 weeks:          1.  Patient will report at least 75%  fewer episodes of pain at work with use of Strategies. 2.  Patient will report at least 75% fewer episodes of nighttime numbness due to improved sleep positioning and exercises.   2/16: Initiated, administered ulnar/median nerve compression sleep positioning educational handout    3.  Patient will be able to incorporate adaptive strategies to allow her to engage in desired fitness routines without increase in symptoms.        PLAN  []  Upgrade activities as tolerated     [x]  Continue plan of care  []  Update interventions per flow sheet       []  Discharge due to:_  []  Other:_      URMILA Urbano/RHETT 2/16/2021  1:11 PM    Future Appointments   Date Time Provider Bibi Bell   2/16/2021  1:30 PM Navjot Mclain MMCPTPB [facility]   2/16/2021  2:15 PM Danette Proctor [email or web address] [facility]   2/18/2021  3:00 PM Danette Proctor [id number] [facility]   2/18/2021  3:45 PM Agus Grande, PT MMCPTPB [facility]   2/22/2021  2:15 PM Shanice Baca OTR/L MMCPTPB [facility]   2/22/2021  3:00 PM Cynthesaúl Carl, DPT MMCPTPB [facility]   2/25/2021  3:00 PM Von Harjinder, PT MMCPTPB [facility]   2/25/2021  3:45 PM Danette Proctor HKGOAND [facility]

## 2021-02-18 ENCOUNTER — HOSPITAL ENCOUNTER (OUTPATIENT)
Dept: PHYSICAL THERAPY | Age: 36
Discharge: HOME | End: 2021-02-18
Payer: COMMERCIAL

## 2021-02-18 PROCEDURE — 97112 NEUROMUSCULAR REEDUCATION: CPT

## 2021-02-18 PROCEDURE — 97110 THERAPEUTIC EXERCISES: CPT

## 2021-02-18 PROCEDURE — 97140 MANUAL THERAPY 1/> REGIONS: CPT

## 2021-02-18 PROCEDURE — 97035 APP MDLTY 1+ULTRASOUND EA 15: CPT

## 2021-02-18 NOTE — PROGRESS NOTES
PT DAILY TREATMENT NOTE     Patient Name: Barbie Malin  Date:2021  : 1985  [x]  Patient  Verified  Payor: [employer] / Plan: [address] [place] [address] / Product Type: PPO /    In time:3:40  Out time:4:24  Total Treatment Time (min): 44  Visit #: 4 of 8    Medicare/BCBS Only   Total Timed Codes (min):  44 1:1 Treatment Time:  44       Treatment Area: Left knee pain [M25.562]    SUBJECTIVE  Pain Level (0-10 scale): 0/10  Any medication changes, allergies to medications, adverse drug reactions, diagnosis change, or new procedure performed?: [x] No    [] Yes (see summary sheet for update)  Subjective functional status/changes:   [] No changes reported  Pt reports her knee is feeling much better. OBJECTIVE      34 min Therapeutic Exercise:  [x] See flow sheet :   Rationale: increase strength to improve the patients ability to carry out ADL's    10 min Neuromuscular Re-education:  [x]  See flow sheet : jumping off two landing 1   Rationale: improve coordination and improve balance  to improve the patients ability to carry out ADL's          With   [x] TE   [] TA   [] neuro   [] other: Patient Education: [x] Review HEP    [] Progressed/Changed HEP based on:   [] positioning   [] body mechanics   [] transfers   [] heat/ice application    [] other:      Other Objective/Functional Measures:   Increased intensity of exercise, pt handled well. OTB around feet, BTB from knees for side shuffles. VC for pt to step up all the way on the 12\" box before adding trail leg     Added two foot take offs 1 foot landing. Pt handled it well, no increased pain. 5:00 jog trial: pt reported no increase in pain. Pain Level (0-10 scale) post treatment: 0/10    ASSESSMENT/Changes in Function:   Pt is progressing toward therapy goals. Pt reported she is back to work fully and completing all required aspects with lifting and climbing ladders. Pt reported no knee pain with any exercise on this date.  Continue to progress pt for higher intensity, add in jumps on next visit. Patient will continue to benefit from skilled PT services to modify and progress therapeutic interventions, address functional mobility deficits, address strength deficits and assess and modify postural abnormalities to attain remaining goals. Progress towards goals / Updated goals:  1. Pt will increase FOTO score by 16 to show significant increase to show improvement in quality of life. 2. Pt will increase IR of L hip to 38* to match uninvolved side to allow improved quality of motion when running. 3. Pt will report less then 2/10 anterior knee pain while ascending/descending ladders to allow pt to return to full work related duties.    Progressing: pt reported 2/10 pain when climbing ladders (2/28/21)  4.  Pt will be able to run 1 mile with no reports of knee pain to allow patient to participate in activities of choice.        PLAN  []  Upgrade activities as tolerated     [x]  Continue plan of care  []  Update interventions per flow sheet       []  Discharge due to:_  []  Other:_      Bessy Mcknight 2/18/2021  3:39 PM    Future Appointments   Date Time Provider Bibi Bell   2/18/2021  3:45 PM Erika Lucio, PT MMCPTPB [facility]   2/22/2021  2:15 PM Elgin Taylor OTR/L MMCPTPB [facility]   2/22/2021  3:00 PM Juan R Ackerman DPT MMCPTPB [facility]   2/25/2021  3:00 PM Erika Lucio PT MMCPTPB [facility]   2/25/2021  3:45 PM Eli Gore [email or web address] [facility]

## 2021-02-18 NOTE — PROGRESS NOTES
OT DAILY TREATMENT NOTE     Patient Name: Kayla Clark  Date:2021  : 1985  [x]  Patient  Verified  Payor: Wilber Buchanan / Plan: [address] [place] [address] / Product Type: PPO /    In time:3:00  Out time:3:40  Total Treatment Time (min): 40  Visit #: 4 of 8    Medicare/BCBS Only   Total Timed Codes (min):  40 1:1 Treatment Time:  40     Treatment Area: Right wrist pain [M25.531]  Right hand pain [M79.641]    SUBJECTIVE  Pain Level (0-10 scale): 0/10  Any medication changes, allergies to medications, adverse drug reactions, diagnosis change, or new procedure performed?: [x] No    [] Yes (see summary sheet for update)  Subjective functional status/changes:   [] No changes reported  Reports doing HEP at home and feels that it is helping   Reports that lifting boxes still aggravates right UE  Reports that she does sleep with right UE under her and flexed at wrist, elbow, and shoulder     OBJECTIVE    Modality rationale: decrease pain and increase tissue extensibility to improve the patients ability to grasp   Min Type Additional Details    [] Estim:  []Unatt       []IFC  []Premod                        []Other:  []w/ice   []w/heat  Position:  Location:    [] Estim: []Att    []TENS instruct  []NMES                    []Other:  []w/US   []w/ice   []w/heat  Position:  Location:    []  Traction: [] Cervical       []Lumbar                       [] Prone          []Supine                       []Intermittent   []Continuous Lbs:  [] before manual  [] after manual   8 [x]  Ultrasound: []Continuous   _ Pulsed                           []1MHz   [x]3MHz W/cm2:1.00  Location:right mid dorsal forearm    []  Iontophoresis with dexamethasone         Location: [] Take home patch   [] In clinic    []  Ice     []  heat  []  Ice massage  []  Laser   []  Paraffin Position:  Location:    []  Laser with stim  []  Other:  Position:  Location:    []  Vasopneumatic Device Pressure:       [] lo [] med [] hi   Temperature: [] lo [] med [] hi       [x] Skin assessment post-treatment:  [x]intact []redness- no adverse reaction    []redness  adverse reaction:       22 min Therapeutic Exercise:  [] See flow sheet :   Rationale: increase ROM and increase strength to improve the patients ability to  and lift    Ulnar nerve glides x5  Arms on wall   Scalene Stretch  Doorway Stretch  Scap Retraction   Thoracic Extension     10 min Manual Therapy: The manual therapy interventions were performed at a separate and distinct time from the therapeutic activities interventions. Rationale: decrease pain, increase ROM and increase tissue extensibility to grasp,  and lift    IASTM to dorsal forearm     With   [x] TE   [] TA   [] neuro   [x] other: Patient Education: [x] Review HEP    [] Progressed/Changed HEP based on: added Ulnar nerve glide to HEP  [] positioning   [] body mechanics   [] transfers   [] heat/ice application   [] Splint wear/care   [] Sensory re-education   [] scar management      [x] other: reviewed sleep positions and HEP           Other Objective/Functional Measures:   Pt able to tolerate HEP and Ulnar glides with no C/O of pain    Pain Level (0-10 scale) post treatment: 0/10      ASSESSMENT/Changes in Function:   Increased ability to perform HEP, decreased inflammation noted during IASTM     Patient will continue to benefit from skilled OT services to modify and progress therapeutic interventions, address ROM deficits, address strength deficits and instruct in home and community integration to attain remaining goals. [x]  See Plan of Care  []  See progress note/recertification  []  See Discharge Summary         Progress towards goals / Updated goals:  Short Term Goals: To be accomplished in 2 weeks:  1.  Patient will be independent in home exercise program for general UE stretching and nerve gliding.  2/11/21: initiated radial nerve glide HEP and stretching exercises   2/18/21: initiated ulnar nerve glides    2.  Patient will be familiar with pain management strategies to use during work and home care to reduce symptoms. 2/11/21: educated on heat and ice modalities as well as stretching exercises    2/18/21: reports doing HEP at work to reduce pain    [address] be accomplished in [facility] [address] will report at least 75%  fewer episodes of pain at work with use of Strategies.     2.  Patient will report at least 75% fewer episodes of nighttime numbness due to improved sleep positioning and exercises. 2/16: Initiated, administered ulnar/median nerve compression sleep positioning educational handout       3. Juan Pablo Hurst will be able to incorporate adaptive strategies to allow her to engage in desired fitness routines without increase in symptoms.        PLAN  []  Upgrade activities as tolerated     [x]  Continue plan of care  []  Update interventions per flow sheet       []  Discharge due to:_  []  Other:_      Lauren CLARK 2/18/2021  11:58 AM    Jonas Lundberg      Future Appointments   Date Time Provider Bibi Bell   2/18/2021  3:00 PM Claudia Santamaria PLRGAEM [facility]   2/18/2021  3:45 PM Bobbi Delay, PT MMCPTPB [facility]   2/22/2021  2:15 PM Catarino Hillman, OTR/L MMCPTPB [facility]   2/22/2021  3:00 PM Jacqui Navarrete, DPT [id number] [facility]   2/25/2021  3:00 PM Bobbi Delay, PT [id number] [facility]   2/25/2021  3:45 PM Claudia Santamaria [id number] [facility]

## 2021-02-22 ENCOUNTER — HOSPITAL ENCOUNTER (OUTPATIENT)
Dept: PHYSICAL THERAPY | Age: 36
Discharge: HOME | End: 2021-02-22
Payer: COMMERCIAL

## 2021-02-22 PROCEDURE — 97035 APP MDLTY 1+ULTRASOUND EA 15: CPT

## 2021-02-22 PROCEDURE — 97112 NEUROMUSCULAR REEDUCATION: CPT

## 2021-02-22 PROCEDURE — 97140 MANUAL THERAPY 1/> REGIONS: CPT

## 2021-02-22 PROCEDURE — 97110 THERAPEUTIC EXERCISES: CPT

## 2021-02-22 NOTE — PROGRESS NOTES
PT DAILY TREATMENT NOTE 11    Patient Name: Zara José  Date:2021  : 1985  [x]  Patient  Verified  Payor: [employer] / Plan: [address] [place] [address] / Product Type: PPO /    In time:3:03P  Out time:3:43P  Total Treatment Time (min): 40  Visit #: 5 of 8    Medicare/BCBS Only   Total Timed Codes (min):  40 1:1 Treatment Time:  40       Treatment Area: Left knee pain [M25.562]    SUBJECTIVE  Pain Level (0-10 scale): 0/10  Any medication changes, allergies to medications, adverse drug reactions, diagnosis change, or new procedure performed?: [x] No    [] Yes (see summary sheet for update)  Subjective functional status/changes:   [] No changes reported  Pt reports running >1 mile this morning with no irritation in symptoms. Reports continued improvements in functional status with participation in skilled PT services. OBJECTIVE    30 min Therapeutic Exercise:  [x] See flow sheet :   Rationale: increase strength to improve the patients ability to carry out ADL's    10 min Neuromuscular Re-education:  [x]  See flow sheet :    Rationale: improve coordination and improve balance  to improve the patients ability to carry out ADL's          With   [x] TE   [] TA   [] neuro   [] other: Patient Education: [x] Review HEP    [] Progressed/Changed HEP based on:   [] positioning   [] body mechanics   [] transfers   [] heat/ice application    [] other:      Other Objective/Functional Measures:   Decreased Hip Hinge/Increased Trunk & Knee Flexion with DL Squat  Increased L Knee Genu Valgum SL Squat  Decreased COG during Box Lift from Floor    Pain Level (0-10 scale) post treatment: 0/10    ASSESSMENT/Changes in Function:     Continued progression of POC through weighted box lifts from floor and isolated LE strengthening/stability activities for overall functional carryover of completing job responsibilities with greater ease and efficiency.   Verbal and tactile cuing required to prevent deficits noted above. 100% therapeutic carryover noted upon removal of cuing. Pt completes session with no increase in ss/sx. Patient will continue to benefit from skilled PT services to modify and progress therapeutic interventions, address functional mobility deficits, address strength deficits and assess and modify postural abnormalities to attain remaining goals. Progress towards goals / Updated goals:  1. Pt will increase FOTO score by 16 to show significant increase to show improvement in quality of life. 2. Pt will increase IR of L hip to 38* to match uninvolved side to allow improved quality of motion when running. 3. Pt will report less then 2/10 anterior knee pain while ascending/descending ladders to allow pt to return to full work related duties.    Progressing: pt reported 2/10 pain when climbing ladders (2/28/21)  4.  Pt will be able to run 1 mile with no reports of knee pain to allow patient to participate in activities of choice.   Goal MET: ran > 1 mile with no increase in knee pain, 02/22/21       PLAN  [x]  Upgrade activities as tolerated     [x]  Continue plan of care  []  Update interventions per flow sheet       []  Discharge due to:_  []  Other:_      Mery Croft DPT 2/22/2021  3:39 PM    Future Appointments   Date Time Provider Bibi Bell   2/25/2021  3:00 PM Kevin León PT [id number] [facility]   2/25/2021  3:45 PM Kwame Medrano [id number] [facility]

## 2021-02-22 NOTE — PROGRESS NOTES
OT DAILY TREATMENT NOTE     Patient Name: Estela Yanez  Date:2021  : 1985  [x]  Patient  Verified  Payor: BL[employer] / Plan: [address] [place] [address] / Product Type: PPO /    In time:2:25  Out time:3:00  Total Treatment Time (min): 35  Visit #: 5 of 8    Medicare/BCBS Only   Total Timed Codes (min):  35 1:1 Treatment Time:  35     Treatment Area: Right wrist pain [M25.531]  Right hand pain [M79.641]    SUBJECTIVE  Pain Level (0-10 scale): 0/10  Any medication changes, allergies to medications, adverse drug reactions, diagnosis change, or new procedure performed?: [x] No    [] Yes (see summary sheet for update)  Subjective functional status/changes:   [] No changes reported  Pt states she is really happy with her progress  Reports far less amount of pain while working  Reports feeling minimal pain when unloading boxes at work, this is the only cause of symptoms at this point.     OBJECTIVE    Modality rationale: decrease pain and increase tissue extensibility to improve the patients ability to grasp   Min Type Additional Details    [] Estim:  []Unatt       []IFC  []Premod                        []Other:  []w/ice   []w/heat  Position:  Location:    [] Estim: []Att    []TENS instruct  []NMES                    []Other:  []w/US   []w/ice   []w/heat  Position:  Location:    []  Traction: [] Cervical       []Lumbar                       [] Prone          []Supine                       []Intermittent   []Continuous Lbs:  [] before manual  [] after manual   8 [x]  Ultrasound: []Continuous   [x] Pulsed 50%                           []1MHz   [x]3MHz W/cm2: 1.00  Location:right mid dorsal forearm    []  Iontophoresis with dexamethasone         Location: [] Take home patch   [] In clinic    []  Ice     []  heat  []  Ice massage  []  Laser   []  Paraffin Position:  Location:    []  Laser with stim  []  Other:  Position:  Location:    []  Vasopneumatic Device Pressure:       [] lo [] med [] hi Temperature: [] lo [] med [] hi       [x] Skin assessment post-treatment:  [x]intact []redness- no adverse reaction    []redness  adverse reaction:       17 min Therapeutic Exercise:  [] See flow sheet :   Rationale: increase ROM and increase strength to improve the patients ability to  and lift    Right:  Ulnar nerve glides x10  Wrist Mazes 10x each   Reviewed all HEP    10 min Manual Therapy:  IASTM   The manual therapy interventions were performed at a separate and distinct time from the therapeutic activities interventions. Rationale: decrease pain, increase ROM and increase tissue extensibility to grasp  and lift    IASTM to dorsal forearm    With   [] TE   [] TA   [] neuro   [x] other: Patient Education: [x] Review HEP    [] Progressed/Changed HEP based on:   [] positioning   [] body mechanics   [] transfers   [] heat/ice application   [] Splint wear/care   [] Sensory re-education   [] scar management      [x] other: reviewed Ulnar nerve glides and self massage             Other Objective/Functional Measures:   Able to perform all HEP with no help from OTAS  Discussed adaptive ways to unpack boxes at work that would decrease pain    Pain Level (0-10 scale) post treatment: 0/10    ASSESSMENT/Changes in Function:   Independent with all HEP  No inflammation noted during IASTM    Patient will continue to benefit from skilled OT services to modify and progress therapeutic interventions, address ROM deficits, address strength deficits and instruct in home and community integration to attain remaining goals. [x]  See Plan of Care  []  See progress note/recertification  []  See Discharge Summary         Progress towards goals / Updated goals:  Short Term Goals:  To be accomplished in 2 weeks:  1.  Patient will be independent in home exercise program for general UE stretching and nerve gliding.  2/11/21: initiated radial nerve glide HEP and stretching exercises   2/18/21: initiated ulnar nerve glides  2/22/21: goal met independent in HEP for general UE stretching and nerve glides     2.  Patient will be familiar with pain management strategies to use during work and home care to reduce symptoms. 2/11/21: educated on heat and ice modalities as well as stretching exercises    2/18/21: reports doing HEP at work to reduce pain  2/22/21: Goal met: Pt reports doing HEP and ice and heat modalities to manage pain     Long Term Goals: To be accomplished in 4 weeks:          1.  Patient will report at least 75%  fewer episodes of pain at work with use of Strategies.   2/22/21: goal met: reports 75% fewer episodes of pain at work    2. Angeline Jamison will report at least 75% fewer episodes of nighttime numbness due to improved sleep positioning and exercises. 2/16: Initiated, administered ulnar/median nerve compression sleep positioning educational handout  2/22/21: goal met: report performing the sleep positions and is having 75% fewer episodes of nighttime numbness     3.  Patient will be able to incorporate adaptive strategies to allow her to engage in desired fitness routines without increase in symptoms.   2/21/21: pt reports no pain with fitness routines,sympyoms are work related only.     New goal added  4. Patient will incorporate adaptive strategies to perform work tasks with right UE to decrease pain.       PLAN  []  Upgrade activities as tolerated     [x]  Continue plan of care  []  Update interventions per flow sheet       []  Discharge due to:_  []  Other:_      Orvin Dancer, OTR/L  Jail[address] 2/22/2021  10:28 AM    Future Appointments   Date Time Provider Bibi Bell   2/22/2021  2:15 PM Juan R Flanagan OTR/L MMCPTPB [facility]   2/22/2021  3:00 PM MAIRA SpenceT MMCPTPB [facility]   2/25/2021  3:00 PM Riky Kimball PT MMCPTPB [facility]   2/25/2021  3:45 PM Jean CHANDLEROUJROSALBA [facility]

## 2021-02-25 ENCOUNTER — HOSPITAL ENCOUNTER (OUTPATIENT)
Dept: PHYSICAL THERAPY | Age: 36
Discharge: HOME | End: 2021-02-25
Payer: COMMERCIAL

## 2021-02-25 PROCEDURE — 97110 THERAPEUTIC EXERCISES: CPT

## 2021-02-25 PROCEDURE — 97140 MANUAL THERAPY 1/> REGIONS: CPT

## 2021-02-25 PROCEDURE — 97035 APP MDLTY 1+ULTRASOUND EA 15: CPT

## 2021-02-25 NOTE — PROGRESS NOTES
PT DAILY TREATMENT NOTE     Patient Name: Karen Burdick  Date:2021  : 1985  [x]  Patient  Verified  Payor: BLUE CROSS / Plan: [address] [place] [address] / Product Type: PPO /    In time: 3:01  Out time: 3:43  Total Treatment Time (min): 42  Visit #: 6 of 8    Medicare/BCBS Only   Total Timed Codes (min):  42 1:1 Treatment Time:  42       Treatment Area: Left knee pain [M25.562]    SUBJECTIVE  Pain Level (0-10 scale): 0  Any medication changes, allergies to medications, adverse drug reactions, diagnosis change, or new procedure performed?: [x] No    [] Yes (see summary sheet for update)  Subjective functional status/changes:   [] No changes reported  Patient states she does not have any pain at the moment. She reports that she does have difficulty and pain with the ladder at work and getting low to the ground. Pt states that the pain is only a 1/10. She went for a run Monday morning for 20 minutes and felt good. OBJECTIVE    42 min Therapeutic Exercise:  [x] See flow sheet :   Rationale: increase strength and improve balance to improve the patients ability to perform work duties      With   [] TE   [] TA   [] neuro   [] other: Patient Education: [x] Review HEP    [] Progressed/Changed HEP based on:   [] positioning   [] body mechanics   [] transfers   [] heat/ice application    [] other:      Other Objective/Functional Measures:  Increased intensity of exercise, pt responded well  Added jumping activities to focus on loading the knee and maintaining eccentric control  More difficulty with landing with left LE compared with right during SL landing    Pain Level (0-10 scale) post treatment: 0    ASSESSMENT/Changes in Function:   Pt is able to perform advanced exercises well and has been getting back to running 20'. She still has difficulty with climbing the ladder at work and getting to the ground due to quad weakness and instability in her knee. She reports having less pain since [facility]. Patient will continue to benefit from skilled PT services to modify and progress therapeutic interventions, address functional mobility deficits, address strength deficits and analyze and modify body mechanics/ergonomics to attain remaining goals. Progress towards goals / Updated goals:  1. Pt will increase FOTO score by 16 to show significant increase to show improvement in quality of life. 2. Pt will increase IR of L hip to 38* to match uninvolved side to allow improved quality of motion when running. 3. Pt will report less then 2/10 anterior knee pain while ascending/descending ladders to allow pt to return to full work related duties.    MET: pt reported a 1/10 (2/25/21)  4.  Pt will be able to run 1 mile with no reports of knee pain to allow patient to participate in activities of choice.   Goal MET: ran > 1 mile with no increase in knee pain, 02/22/21       PLAN  []  Upgrade activities as tolerated     [x]  Continue plan of care  []  Update interventions per flow sheet       []  Discharge due to:_  []  Other:_      Rosie Cazares 2/25/2021  3:09 PM    Future Appointments   Date Time Provider Bibi Bell   2/25/2021  3:45 PM Judson Carrillo [id number] [address]

## 2021-02-25 NOTE — PROGRESS NOTES
OT DAILY TREATMENT NOTE 10-18    Patient Name: Sherren Rosenthal  Date:2021  : 1985  [x]  Patient  Verified  Payor: [employer] / Plan: [address] [place] [address] / Product Type: PPO /    In time:343  Out time:422  Total Treatment Time (min): 39  Visit #: 6 of 8    Medicare/BCBS Only   Total Timed Codes (min):  39 1:1 Treatment Time:  39     Treatment Area: Right wrist pain [M25.531]  Right hand pain [M79.641]    SUBJECTIVE  Pain Level (0-10 scale): 0  Any medication changes, allergies to medications, adverse drug reactions, diagnosis change, or new procedure performed?: [x] No    [] Yes (see summary sheet for update)  Subjective functional status/changes:   [] No changes reported  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 Estim:  []? Unatt       []? IFC  []? Premod                        []?Other:  []?w/ice   []?w/heat  Position:  Location:      []? Estim: []? Att    []? TENS instruct  []? NMES                    []?Other:  []?w/US   []?w/ice   []?w/heat  Position:  Location:      []? Traction: []? Cervical       []? Lumbar                       []? Prone          []? Supine                       []?Intermittent   []? Continuous Lbs:  []? before manual  []? after manual    8 [x]? Ultrasound: []? Continuous   [x]? Pulsed 50%                           []? 1MHz   [x]? 3MHz W/cm2: 1.00  Location:right mid dorsal forearm      []? Iontophoresis with dexamethasone         Location: []? Take home patch   []? In clinic      []? Ice     []?  heat  []? Ice massage  []? Laser   []? Paraffin Position:  Location:      []? Laser with stim  []? Other:  Position:  Location:      []? Vasopneumatic Device Pressure:       []? lo []? med []? hi   Temperature: []? lo []? med []? hi       [x]? Skin assessment post-treatment:  [x]? intact []? redness- no adverse reaction    []? redness  adverse reaction:        21 min Therapeutic Exercise:  [] See flow sheet :   Rationale: increase ROM and increase strength to improve the patients ability to reach,      Forearm Extension Stretch   Forearm Flexor Stretch with Pronation   Tendon Glides: 10x  Wrist Mazes 10x    10 min Manual Therapy:  IASTM   The manual therapy interventions were performed at a separate and distinct time from the therapeutic activities interventions. Rationale: decrease pain, increase ROM and increase tissue extensibility to improve ability to functionally use Right UE    IASTM to forearm/elbow on wedge       With   [x] TE   [] TA   [] neuro   [] other: Patient Education: [x] Review HEP    [] Progressed/Changed HEP based on:   [x] positioning   [x] body mechanics   [] transfers   [] heat/ice application   [] Splint wear/care   [] Sensory re-education   [] scar management      [x] other: CTS preventative techniques            Other Objective/Functional Measures:     Decreased inflammation noted in forearm       Pain Level (0-10 scale) post treatment: 0/10    ASSESSMENT/Changes in Function: Progressing towards all goals, plan for DC two visits     Patient will continue to benefit from skilled OT services to modify and progress therapeutic interventions, address ROM deficits, address strength deficits and instruct in home and community integration to attain remaining goals. []  See Plan of Care  []  See progress note/recertification  []  See Discharge Summary         Progress towards goals / Updated goals:  Short Term Goals:  To be accomplished in 2 weeks:  1.  Patient will be independent in home exercise program for general UE stretching and nerve gliding.  2/11/21: initiated radial nerve glide HEP and stretching exercises   2/18/21: initiated ulnar nerve glides  2/22/21: goal met independent in HEP for general UE stretching and nerve glides     2.  Patient will be familiar with pain management strategies to use during work and home care to reduce symptoms. 2/11/21: educated on heat and ice modalities as well as stretching exercises    2/18/21: reports doing HEP at work to reduce pain  2/22/21: Goal met: Pt reports doing HEP and ice and heat modalities to manage pain     Long Term Goals: To be accomplished in 4 weeks:          1.  Patient will report at least 75%  fewer episodes of pain at work with use of Strategies.   2/22/21: goal met: reports 75% fewer episodes of pain at work     2. Marsha Callahan will report at least 75% fewer episodes of nighttime numbness due to improved sleep positioning and exercises. 2/16: Initiated, administered ulnar/median nerve compression sleep positioning educational handout  2/22/21: goal met: report performing the sleep positions and is having 75% fewer episodes of nighttime numbness     3.  Patient will be able to incorporate adaptive strategies to allow her to engage in desired fitness routines without increase in symptoms.   2/21/21: pt reports no pain with fitness routines,sympyoms are work related only.     New goal added  4. Patient will incorporate adaptive strategies to perform work tasks with right UE to decrease pain.   2/25: Met per patient repot     PLAN  []  Upgrade activities as tolerated     [x]  Continue plan of care  []  Update interventions per flow sheet       []  Discharge due to:_  []  Other:_      MALCOLM Bhatt 2/25/2021  8:47 AM    Future Appointments   Date Time Provider Bibi Bell   2/25/2021  3:00 PM Edmund Mark, PT MMCPTPB [facility]   2/25/2021  3:45 PM Danie LIVINGSTON [facility]

## 2021-03-04 ENCOUNTER — HOSPITAL ENCOUNTER (OUTPATIENT)
Dept: PHYSICAL THERAPY | Age: 36
Discharge: HOME | End: 2021-03-04
Payer: COMMERCIAL

## 2021-03-04 PROCEDURE — 97110 THERAPEUTIC EXERCISES: CPT

## 2021-03-04 PROCEDURE — 97035 APP MDLTY 1+ULTRASOUND EA 15: CPT

## 2021-03-04 NOTE — PROGRESS NOTES
PT DAILY TREATMENT NOTE     Patient Name: Sergo Castle  Date:3/4/2021  : 1985  [x]  Patient  Verified  Payor: BLUE CROSS / Plan: [address] [place] [address] / Product Type: PPO /    In time:1:30  Out time:2:10  Total Treatment Time (min): 40  Visit #: 7 of 8    Medicare/BCBS Only   Total Timed Codes (min):  38 1:1 Treatment Time:  38       Treatment Area: Left knee pain [M25.562]    SUBJECTIVE  Pain Level (0-10 scale): 0/10  Any medication changes, allergies to medications, adverse drug reactions, diagnosis change, or new procedure performed?: [x] No    [] Yes (see summary sheet for update)  Subjective functional status/changes:   [] No changes reported  Pt reports 100% overall improvement since start of care. She was able to do ladders on Tuesday without pain. She would like to be DC next visit. OBJECTIVE    38 min Therapeutic Exercise:  [x] See flow sheet :   Rationale: increase ROM and increase strength to improve the patients ability to maintain reduced pain levels and allow for continued progression independently following DC. With   [] TE   [] TA   [] neuro   [] other: Patient Education: [x] Review HEP    [] Progressed/Changed HEP based on:   [] positioning   [] body mechanics   [] transfers   [] heat/ice application    [] other:      Other Objective/Functional Measures:     Subjective information obtained during treadmill  Reviewed final HEP     Correct mechanics and no pain with box lift   Mild left knee pain with reverse lunges with 5# dumbbell, pain subsided immediately   Mild valgus collapse with SL squats, educated pt regarding kinetic chain alignment   No pain following session     Pain Level (0-10 scale) post treatment: 0/10    ASSESSMENT/Changes in Function:     Pt is making steady progress in therapy, reporting 100% improvement in symptoms. She is now able to run and climb ladders without pain.   She would like to continue therapy for 1 final session for final review of HEP. Anticipated DC next visit. Patient will continue to benefit from skilled PT services to modify and progress therapeutic interventions, address ROM deficits, address strength deficits, analyze and address soft tissue restrictions, analyze and cue movement patterns, analyze and modify body mechanics/ergonomics, assess and modify postural abnormalities and instruct in home and community integration to attain remaining goals. Progress towards goals / Updated goals:  1. Pt will increase FOTO score by 16 to show significant increase to show improvement in quality of life. 2. Pt will increase IR of L hip to 38* to match uninvolved side to allow improved quality of motion when running. Goal met. 40* 3/4/21  3. Pt will report less then 2/10 anterior knee pain while ascending/descending ladders to allow pt to return to full work related duties. Yanci [place] met. Pt reports that worked on ladders on Tuesday without any pain 3/4/21  4.  Pt will be able to run 1 mile with no reports of knee pain to allow patient to participate in activities of choice.   Goal met: ran > 1 mile with no increase in knee pain, 02/22/21 No change- has not tried more than 1 mile 3/4/21        PLAN  []  Upgrade activities as tolerated     [x]  Continue plan of care  []  Update interventions per flow sheet       []  Discharge due to:_  [x]  Other: DC next visit       Huy Courtney PT 3/4/2021  1:34 PM    Future Appointments   Date Time Provider Bibi Bell   3/4/2021  6:00 PM Za Sage [facility]   3/5/2021  3:45 PM Edith Herr, IRAM MMCPTPB [facility]   3/5/2021  4:30 PM Kwame CARMICHAELQ [facility]   3/8/2021  4:30 PM Kevin León, IRAM [facility]   3/11/2021  3:45 PM Kevin León PT [facility]

## 2021-03-04 NOTE — PROGRESS NOTES
OT DAILY TREATMENT NOTE     Patient Name: Kayla Clark  Date:3/4/2021  : 1985  [x]  Patient  Verified  Payor: [employer] / Plan: [address] [place] [address] / Product Type: PPO /    In time:600  Out time:631  Total Treatment Time (min): 31  Visit #: 7 of 8    Medicare/BCBS Only   Total Timed Codes (min):  31 1:1 Treatment Time:  31     Treatment Area: Right wrist pain [M25.531]  Right hand pain [M79.641]    SUBJECTIVE  Pain Level (0-10 scale): 1/10  Any medication changes, allergies to medications, adverse drug reactions, diagnosis change, or new procedure performed?: [x] No    [] Yes (see summary sheet for update)  Subjective functional status/changes:   [] No changes reported  Feeling much better    OBJECTIVE    Modality rationale: decrease inflammation and decrease pain to improve the patients ability to lift   Min Type Additional Details    [] Estim:  []Unatt       []IFC  []Premod                        []Other:  []w/ice   []w/heat  Position:  Location:    [] Estim: []Att    []TENS instruct  []NMES                    []Other:  []w/US   []w/ice   []w/heat  Position:  Location:    []  Traction: [] Cervical       []Lumbar                       [] Prone          []Supine                       []Intermittent   []Continuous Lbs:  [] before manual  [] after manual   8 [x]  Ultrasound: []Continuous   [x] Pulsed                      50%     []1MHz   [x]3MHz W/cm2:1.0  Location:    []  Iontophoresis with dexamethasone         Location: [] Take home patch   [] In clinic    []  Ice     []  heat  []  Ice massage  []  Laser   []  Paraffin Position:  Location:    []  Laser with stim  []  Other:  Position:  Location:    []  Vasopneumatic Device Pressure:       [] lo [] med [] hi   Temperature: [] lo [] med [] hi       [x] Skin assessment post-treatment:  [x]intact []redness- no adverse reaction    []redness  adverse reaction:       23 min Therapeutic Exercise:  [] See flow sheet :   Rationale: increase ROM to improve the patients ability to move wrist without pain  Forearm stretches right forearm  Recheck right  and pinch  Wrist mazes right 10 each   Review of HEP    With   [] TE   [] TA   [] neuro   [] other: Patient Education: [x] Review HEP    [] Progressed/Changed HEP based on: final HEP  [] positioning   [] body mechanics   [] transfers   [] heat/ice application   [] Splint wear/care   [] Sensory re-education   [] scar management      [] other:            Other Objective/Functional Measures:     Hand Strength:    Gross Grasp 3pt Pinch Lateral Pinch Tip Pinch   Right  (62) 74 (16)17  (15) 17 12 (12)   Left 65 15 13 11      FOTO 98 (72)     Pain Level (0-10 scale) post treatment: 0/10    ASSESSMENT/Changes in Function: improved strength, pain and funciton     []  See Plan of Care  []  See progress note/recertification  [x]  See Discharge Summary         Progress towards goals / Updated goals:  1.  Patient will be independent in home exercise program for general UE stretching and nerve gliding.  2/11/21: initiated radial nerve glide HEP and stretching exercises   2/18/21: initiated ulnar nerve glides  2/22/21: goal met independent in HEP for general UE stretching and nerve glides     2.  Patient will be familiar with pain management strategies to use during work and home care to reduce symptoms. 2/11/21: educated on heat and ice modalities as well as stretching exercises    2/18/21: reports doing HEP at work to reduce pain  2/22/21: Goal met: Pt reports doing HEP and ice and heat modalities to manage pain     Long Term Goals: To be accomplished in 4 weeks:          1.  Patient will report at least 75%  fewer episodes of pain at work with use of Strategies.   2/22/21: goal met: reports 75% fewer episodes of pain at work     2. Bernard Cardozo will report at least 75% fewer episodes of nighttime numbness due to improved sleep positioning and exercises.   2/16: Initiated, administered ulnar/median nerve compression sleep positioning educational handout  2/22/21: goal met: report performing the sleep positions and is having 75% fewer episodes of nighttime numbness    3.  Patient will be able to incorporate adaptive strategies to allow her to engage in desired fitness routines without increase in symptoms.   2/21/21: pt reports no pain with fitness routines,sympyoms are work related only.     New goal added  4. Patient will incorporate adaptive strategies to perform work tasks with right UE to decrease pain.   2/25: Met per patient report     PLAN  []  Upgrade activities as tolerated     []  Continue plan of care  []  Update interventions per flow sheet       []  Discharge due to:goals met_  []  Other:_      Valentín Smith, OTR/L 3/4/2021  6:02 PM    Future Appointments   Date Time Provider Bibi Bell   3/5/2021  3:00 PM Claudia Santamaria [email or web address] [facility]   3/5/2021  3:45 PM Edith Londono, PT ARAUTME [facility]   3/8/2021  4:30 PM Bobbi Delay, PT [id number] [facility]   3/11/2021  3:45 PM Bobbi Delay, PT [facility]CP[facility]

## 2021-03-05 ENCOUNTER — HOSPITAL ENCOUNTER (OUTPATIENT)
Dept: PHYSICAL THERAPY | Age: 36
Discharge: HOME | End: 2021-03-05
Payer: COMMERCIAL

## 2021-03-05 ENCOUNTER — APPOINTMENT (OUTPATIENT)
Dept: PHYSICAL THERAPY | Age: 36
End: 2021-03-05
Payer: COMMERCIAL

## 2021-03-05 PROCEDURE — 97110 THERAPEUTIC EXERCISES: CPT

## 2021-03-05 PROCEDURE — 97530 THERAPEUTIC ACTIVITIES: CPT

## 2021-03-05 NOTE — PROGRESS NOTES
[facility] Daniel Alexander  [address] [facility]  [phone] fax    Physical Therapy Discharge Summary    Patient name: Neelam Kaplan Start of Care: 2021   [facility] 1985   Medical/Treatment Diagnosis: Left knee pain [M25.562]  Payor: [email or web address] / Plan: [address] [place] [address] / Product Type: PPO /  Onset Date: 2020     Prior Hospitalization: see medical history Provider#: [id number]   Medications: Verified on Patient Summary List    Comorbidities: Alcohol use  Prior Level of Function:independent, running 4-7 days a week 1-3 miles. Visits from Start of Care: 8    Missed Visits: 0    Reporting Period : 2021 to 3/5/2021    Summary of Care:  Goal: Pt will be 100% compliant with HEP to increase ability to participate in activities of choice. Status at last note/certification: New goal-established at evaluation  Status at discharge: MET    Goal: Pt will increase FOTO score by 16 to show significant increase to show improvement in quality of life. Status at last note/certification: New HCEF-ZQFH=02  Status at discharge: MET-FOTO=99    Goal: Pt will increase IR of L hip to 38* to match uninvolved side to allow improved quality of motion when running. Status at last note/certification: New goal-left hip IR=30 deg  Status at discharge: MET-left hip IR=40 deg    Goal: Pt will report less then 2/10 anterior knee pain while ascending/descending ladders to allow pt to return to full work related duties. Status at last note/certification: New goal-patient reports pain up to 8/10 when running downhill  Status at discharge: MET-patient reports 0/10 pain at time of therapy session and with activities including climbing ladders at work    Goal: Pt will be able to run 1 mile with no reports of knee pain to allow patient to participate in activities of choice.   Status at last note/certification: New goal-patient limited with running  Status at discharge: MET-patient reports no pain with running 1mi, has not attempted running >1mi      ASSESSMENT/RECOMMENDATIONS: Patient has made good progress with skilled outpatient PT meeting 5/5 initial goals. FOTO score has improved to 99/100 points (+38); she denies pain with activities including walking, running, and climbing ladders at work. Left hip IR AROM has improved by 10 deg. She has been able to return to running without pain. Patient was issued progressive HEP with good understanding.  At this time patient is appropriate for discharge from skilled outpatient PT.     [x]Discontinue therapy: [x]Patient has reached or is progressing toward set goals      []Patient is non-compliant or has abdicated      []Due to lack of appreciable progress towards set goals    Martina Barron, PT 3/5/2021 5:57 PM

## 2021-03-05 NOTE — PROGRESS NOTES
PT DAILY TREATMENT NOTE     Patient Name: Claribel Caban  Date:3/5/2021  : 1985  [x]  Patient  Verified  Payor: BLUE CROSS / Plan: [address] [place] [address] / Product Type: PPO /    In time: 3:46P  Out time:4:24P  Total Treatment Time (min): 38  Visit #: 8 of 8    Medicare/BCBS Only   Total Timed Codes (min):  38 1:1 Treatment Time:  38       Treatment Area: Left knee pain [M25.562]    SUBJECTIVE   Pain Level (0-10 scale): 0/10  Any medication changes, allergies to medications, adverse drug reactions, diagnosis change, or new procedure performed?: [x] No    [] Yes (see summary sheet for update)  Subjective functional status/changes:   [] No changes reported    Patient reports she is prepared for discharge today. She was able to attempt ladders at work without pain.      She walked a couple blocks to therapy today and has been on a walking program.     OBJECTIVE reaction:       28 min Therapeutic Exercise:  [] See flow sheet :   Rationale: increase ROM, increase strength, improve coordination and improve balance to improve the patients ability to perform ADLs with increased ease    10 min Therapeutic Activity:  [x]  See flow sheet : FOTO, goal assessment   Rationale: increase ROM, increase strength and improve coordination  to improve the patients ability to perform ADLs with increased ease         With   [] TE   [] TA   [] neuro   [] other: Patient Education: [x] Review HEP    [x] Progressed/Changed HEP based on:  Patient d/c  [] positioning   [] body mechanics   [] transfers   [] heat/ice application    [] other:      Other Objective/Functional Measures:     Good balance with SLS on foam  FOTO=99  Provided GTB for HEP  Continued difficulty with reverse lunges on left with increased depth   Increased challenge with step ups on left vs right    Pain Level (0-10 scale) post treatment: 0/10    ASSESSMENT/Changes in Function: See Discharge Summary      []  See Plan of Care  []  See progress note/recertification  [x]  See Discharge Summary         Progress towards goals / Updated goals:  1. Pt will increase FOTO score by 16 to show significant increase to show improvement in quality of life. MET-FOTO=99  2. Pt will increase IR of L hip to 38* to match uninvolved side to allow improved quality of motion when running. Goal met. 40* 3/4/21  3. Pt will report less then 2/10 anterior knee pain while ascending/descending ladders to allow pt to return to full work related duties. [place] met. Pt reports that worked on ladders on Tuesday without any pain 3/4/21  4.  Pt will be able to run 1 mile with no reports of knee pain to allow patient to participate in activities of choice.   Goal met: ran > 1 mile with no increase in knee pain, 02/22/21 No change- has not tried more than 1 mile 3/4/21     PLAN  []  Upgrade activities as tolerated     []  Continue plan of care  []  Update interventions per flow sheet       [x]  Discharge due to: patient has met all initial goals  []  Other:_      Leslie Sargent, PT 3/5/2021  2:52 PM    Future Appointments   Date Time Provider Bibi Bell   3/5/2021  3:45 PM Edith Souza [place], [place] [facility]   3/8/2021  4:30 PM Rad Saenz, PT [facility]   3/11/2021  3:45 PM Rad Saenz, PT [facility]

## 2021-03-08 ENCOUNTER — APPOINTMENT (OUTPATIENT)
Dept: PHYSICAL THERAPY | Age: 36
End: 2021-03-08
Payer: COMMERCIAL

## 2021-03-08 NOTE — PROGRESS NOTES
[facility] Daniel Munoz  [address] [facility]  [phone] fax    Occupational Therapy Discharge Summary    Patient name: Neelam Kaplan Start of Care: 21   Referral source: KERON Bolaños : 1985   Medical/Treatment Diagnosis: Right wrist pain [M25.531]  Right hand pain [M79.641]  Payor: BLUE CROSS / Plan: [address] [place] [address] / Product Type: PPO /  Onset Date:     Prior Hospitalization: see medical history Provider#: [id number]   Medications: Verified on Patient Summary List    Comorbidities: none  Prior Level of Function:[employer], , yoga, running, reading, 2 children 15 and 10, I self care home care driving   Visits from Start of Care: 7   Missed Visits: 0  Reporting Period : 21 to 3/4/21    Summary of Care:Patient was seen for modalities, therapeutic exercises and activities to improve hand function. S/he has HEP. Current measures listed below with prior in ( )  Hand Strength:    Gross Grasp 3pt Pinch Lateral Pinch Tip Pinch   Right  (62) 74 (16)17  (15) 17 12 (12)   Left 65 15 13 11      FOTO  98        (72)         1.  Patient will be independent in home exercise program for general UE stretching and nerve gliding. Eval status: Did not have  Discharge status; Met     2.  Patient will be familiar with pain management strategies to use during work and home care to reduce symptoms. Eval status; Did not have  Discharge status; Met educated on heat and ice modalities as well as stretching exercises       Long Term Goals: To be accomplished in [place] [address] will report at least 75%  fewer episodes of pain at work with use of Strategies. Eval status; Pain daily  Discharge status:  goal met: reports 75% fewer episodes of pain at work     2. Alyssa  will report at least 75% fewer episodes of nighttime numbness due to improved sleep positioning and exercises.   Eval status; daily numbness  Discharge status; Met     3.  Patient will be able to incorporate adaptive strategies to allow her to engage in desired fitness routines without increase in symptoms.   Eval status; unaware  Discharge status: Met; pt reports no pain with fitness routines,sympyoms are work related only.     New goal added  4. Patient will incorporate adaptive strategies to perform work tasks with right UE to decrease pain. Eval status;   Not doing  Discharge status: Met per patient report      ASSESSMENT/Changes in Function: Improved strength, pain, function per FOTO    ASSESSMENT/RECOMMENDATIONS:  [x]Discontinue therapy: []Patient has reached or is progressing toward set goals      []Patient is non-compliant or has abdicated      []Due to lack of appreciable progress towards set goals    [place] OTR/L 3/8/2021 9:33 AM

## 2021-03-11 ENCOUNTER — APPOINTMENT (OUTPATIENT)
Dept: PHYSICAL THERAPY | Age: 36
End: 2021-03-11
Payer: COMMERCIAL

## 2021-12-28 ENCOUNTER — HOSPITAL ENCOUNTER (EMERGENCY)
Age: 36
Discharge: HOME | End: 2021-12-28
Attending: STUDENT IN AN ORGANIZED HEALTH CARE EDUCATION/TRAINING PROGRAM
Payer: COMMERCIAL

## 2021-12-28 ENCOUNTER — APPOINTMENT (OUTPATIENT)
Dept: CT IMAGING | Age: 36
End: 2021-12-28
Attending: STUDENT IN AN ORGANIZED HEALTH CARE EDUCATION/TRAINING PROGRAM
Payer: COMMERCIAL

## 2021-12-28 ENCOUNTER — APPOINTMENT (OUTPATIENT)
Dept: GENERAL RADIOLOGY | Age: 36
End: 2021-12-28
Attending: PHYSICIAN ASSISTANT
Payer: COMMERCIAL

## 2021-12-28 VITALS
OXYGEN SATURATION: 100 % | HEIGHT: 65 IN | DIASTOLIC BLOOD PRESSURE: 99 MMHG | RESPIRATION RATE: 22 BRPM | SYSTOLIC BLOOD PRESSURE: 162 MMHG | TEMPERATURE: 101 F | WEIGHT: 165 LBS | BODY MASS INDEX: 27.49 KG/M2 | HEART RATE: 105 BPM

## 2021-12-28 DIAGNOSIS — R55 SYNCOPE AND COLLAPSE: ICD-10-CM

## 2021-12-28 DIAGNOSIS — Z20.822 PERSON UNDER INVESTIGATION FOR COVID-19: Primary | ICD-10-CM

## 2021-12-28 DIAGNOSIS — S09.90XA INJURY OF HEAD, INITIAL ENCOUNTER: ICD-10-CM

## 2021-12-28 LAB
ANION GAP SERPL CALC-SCNC: 4 MMOL/L (ref 3–18)
APPEARANCE UR: CLEAR
BACTERIA URNS QL MICRO: ABNORMAL /HPF
BASOPHILS # BLD: 0 K/UL (ref 0–0.1)
BASOPHILS NFR BLD: 0 % (ref 0–2)
BILIRUB UR QL: NEGATIVE
BUN SERPL-MCNC: 8 MG/DL (ref 7–18)
BUN/CREAT SERPL: 14 (ref 12–20)
CALCIUM SERPL-MCNC: 9.7 MG/DL (ref 8.5–10.1)
CHLORIDE SERPL-SCNC: 106 MMOL/L (ref 100–111)
CO2 SERPL-SCNC: 29 MMOL/L (ref 21–32)
COLOR UR: YELLOW
CREAT SERPL-MCNC: 0.58 MG/DL (ref 0.6–1.3)
DIFFERENTIAL METHOD BLD: ABNORMAL
EOSINOPHIL # BLD: 0 K/UL (ref 0–0.4)
EOSINOPHIL NFR BLD: 0 % (ref 0–5)
EPITH CASTS URNS QL MICRO: ABNORMAL /LPF (ref 0–5)
ERYTHROCYTE [DISTWIDTH] IN BLOOD BY AUTOMATED COUNT: 12.1 % (ref 11.6–14.5)
FLUAV RNA SPEC QL NAA+PROBE: NOT DETECTED
FLUBV RNA SPEC QL NAA+PROBE: NOT DETECTED
GLUCOSE SERPL-MCNC: 97 MG/DL (ref 74–99)
GLUCOSE UR STRIP.AUTO-MCNC: NEGATIVE MG/DL
HCG SERPL QL: NEGATIVE
HCT VFR BLD AUTO: 45.6 % (ref 35–45)
HGB BLD-MCNC: 15.3 G/DL (ref 12–16)
HGB UR QL STRIP: NEGATIVE
IMM GRANULOCYTES # BLD AUTO: 0.1 K/UL (ref 0–0.04)
IMM GRANULOCYTES NFR BLD AUTO: 1 % (ref 0–0.5)
KETONES UR QL STRIP.AUTO: 40 MG/DL
LEUKOCYTE ESTERASE UR QL STRIP.AUTO: ABNORMAL
LYMPHOCYTES # BLD: 0.6 K/UL (ref 0.9–3.6)
LYMPHOCYTES NFR BLD: 8 % (ref 21–52)
MCH RBC QN AUTO: 31.4 PG (ref 24–34)
MCHC RBC AUTO-ENTMCNC: 33.6 G/DL (ref 31–37)
MCV RBC AUTO: 93.6 FL (ref 78–100)
MONOCYTES # BLD: 0.7 K/UL (ref 0.05–1.2)
MONOCYTES NFR BLD: 9 % (ref 3–10)
NEUTS SEG # BLD: 6.4 K/UL (ref 1.8–8)
NEUTS SEG NFR BLD: 81 % (ref 40–73)
NITRITE UR QL STRIP.AUTO: NEGATIVE
NRBC # BLD: 0 K/UL (ref 0–0.01)
NRBC BLD-RTO: 0 PER 100 WBC
PH UR STRIP: 6 [PH] (ref 5–8)
PLATELET # BLD AUTO: 186 K/UL (ref 135–420)
PMV BLD AUTO: 10.1 FL (ref 9.2–11.8)
POTASSIUM SERPL-SCNC: 4.1 MMOL/L (ref 3.5–5.5)
PROT UR STRIP-MCNC: NEGATIVE MG/DL
RBC # BLD AUTO: 4.87 M/UL (ref 4.2–5.3)
SARS-COV-2, COV2: DETECTED
SODIUM SERPL-SCNC: 139 MMOL/L (ref 136–145)
SP GR UR REFRACTOMETRY: 1.02 (ref 1–1.03)
UROBILINOGEN UR QL STRIP.AUTO: 1 EU/DL (ref 0.2–1)
WBC # BLD AUTO: 7.9 K/UL (ref 4.6–13.2)
WBC URNS QL MICRO: ABNORMAL /HPF (ref 0–4)

## 2021-12-28 PROCEDURE — 71045 X-RAY EXAM CHEST 1 VIEW: CPT

## 2021-12-28 PROCEDURE — 85025 COMPLETE CBC W/AUTO DIFF WBC: CPT

## 2021-12-28 PROCEDURE — 93005 ELECTROCARDIOGRAM TRACING: CPT

## 2021-12-28 PROCEDURE — 74011250636 HC RX REV CODE- 250/636: Performed by: PHYSICIAN ASSISTANT

## 2021-12-28 PROCEDURE — 87636 SARSCOV2 & INF A&B AMP PRB: CPT

## 2021-12-28 PROCEDURE — 70450 CT HEAD/BRAIN W/O DYE: CPT

## 2021-12-28 PROCEDURE — 74011250637 HC RX REV CODE- 250/637: Performed by: PHYSICIAN ASSISTANT

## 2021-12-28 PROCEDURE — 80048 BASIC METABOLIC PNL TOTAL CA: CPT

## 2021-12-28 PROCEDURE — 84703 CHORIONIC GONADOTROPIN ASSAY: CPT

## 2021-12-28 PROCEDURE — 99284 EMERGENCY DEPT VISIT MOD MDM: CPT

## 2021-12-28 PROCEDURE — 81001 URINALYSIS AUTO W/SCOPE: CPT

## 2021-12-28 RX ORDER — ACETAMINOPHEN 500 MG
1000 TABLET ORAL
Status: COMPLETED | OUTPATIENT
Start: 2021-12-28 | End: 2021-12-28

## 2021-12-28 RX ORDER — ACETAMINOPHEN 500 MG
1000 TABLET ORAL
Status: DISCONTINUED | OUTPATIENT
Start: 2021-12-28 | End: 2021-12-28 | Stop reason: HOSPADM

## 2021-12-28 RX ADMIN — ACETAMINOPHEN 1000 MG: 500 TABLET ORAL at 13:16

## 2021-12-28 RX ADMIN — SODIUM CHLORIDE 1000 ML: 900 INJECTION, SOLUTION INTRAVENOUS at 13:17

## 2021-12-28 NOTE — Clinical Note
[address] [facility] EMERGENCY DEPT  [address] [address]  [phone]    Work/School Note    Date: 12/28/2021     To Whom It May concern:    Pérez Young was evaluated by the following provider(s):  Attending Provider: Bobbi Perez virus is suspected. Per the CDC guidelines we recommend home isolation until the following conditions are all met:    1. At least 10 days have passed since symptoms first appeared and  2. At least 24 hours have passed since last fever without the use of fever-reducing medications and  3.  Symptoms (e.g., cough, shortness of breath) have improved      Sincerely,          Evy Kramer, DO

## 2021-12-28 NOTE — ED NOTES
EMERGENCY DEPARTMENT HISTORY AND PHYSICAL EXAM      Patient Name: Zelda Bland    History of Presenting Illness     HPI:     67-year-old female with no significant medical history presents to the ED for evaluation of a head injury/syncope that occurred just prior to [facility]  She presented there for URI-like symptoms that began yesterday and a fever, T-max 102 °F that has been ongoing all day today. While at the outside facility, she felt very lightheaded, had a brief episode of abdominal discomfort, and subsequently passed out, woke up shortly after. No seizure-like activity noted. She was ambulatory. Upon arrival to our ED, she is only complaining of some vague left forehead discomfort where she struck her head against the floor. Denies any current pain however states she has been having worsening shortness of breath, cough, and fatigue since yesterday. Denies any chest pain, back pain, current lightheadedness, nausea, vomiting, diarrhea, or abdominal pain at this time. PCP: None    No current facility-administered medications on file prior to encounter. Current Outpatient Medications on File Prior to Encounter   Medication Sig Dispense Refill    levonorgestrel (MIRENA) 20 mcg/24 hr (5 years) IUD 1 Each by IntraUTERine route once.  cetirizine (ZYRTEC) 10 mg tablet Take 10 mg by mouth. Past History     Past Medical History:  History reviewed. No pertinent past medical history. Past Surgical History:  Past Surgical History:   Procedure Laterality Date    HX GYN      Dand C    HX OTHER SURGICAL      IUD removal under anesthesia    HX OVARIAN CYST REMOVAL         Family History:  Family History   Problem Relation Age of Onset    Diabetes Mother        Social History:  Social History     Tobacco Use    Smoking status: Never Smoker    Smokeless tobacco: Never Used   Substance Use Topics    Alcohol use: Yes     Comment: rare    Drug use:  No Allergies:  No Known Allergies    Review of Nursing Notes: I have reviewed the relevant nursing notes that were available at the time of this entry. Portions of the Family and Social history, as well as medications and allergies, may have been entered into my documentation by nursing or other ancillary staff; I have confirmed and may have supplemented that information to the best of my ability at the time the note was reviewed. There are some disagreements between the nursing notes and my evaluation at times. Some of the above referenced nursing documentation appears in my note after completion of my review. Review of Systems     CONSTITUTIONAL: Denies chills, sweats, or weight changes. EYES: Denies any visual changes or symptoms  HENT: Denies headaches, changes to hearing, rhinitis, sore throat, dysphagia, or change in voice. CV: Denies chest pains or palpitations. Lungs/Chest: Denies dyspnea, wheezing, or cough. GI: Denies nausea, vomiting, diarrhea, constipation, abdominal pain, hematochezia, or melena. : Denies urinary retention or incontinence. No genital discharge. No dysuria. MSK: Denies myalgias or arthralgias. NEURO: Denies chronic headaches or seizures. No numbness, tingling or weakness. PSYCHIATRIC: Denies problems with mood disturbance and anxiety. DERMATOLOGIC: Denies any rashes or skin changes. Physical Exam     General: In no apparent distress. Well-nourished/well-developed. Head/Neck: Normocephalic, contusion to left forehead. Nontender midline neck, normal ROM. EENT: PERRLA. EOM intact bilaterally. Oropharyngeal mucosa is moist, lesions or erythema. No nasal discharge. Cardiovascular: RRR, no murmurs, gallops, or rubs. Peripheral pulses normal and intact in BUE and BLE. Lungs/Chest: CTAB, no wheezing, rhonchi, or rales. Abdomen: No distention. No organomegaly. No rebound, rigidity, or guarding. Nontender. Extremities/Skin: Warm distal extremities No deformities.  No edema. No rashes. Neuro: A&O x 3. Grossly intact sensations and motor function in upper and lower extremities bilaterally. Psych: Appropriate mood and affect. Diagnostic Study Results     Labs -     Recent Results (from the past 12 hour(s))   CBC WITH AUTOMATED DIFF    Collection Time: 12/28/21 12:04 PM   Result Value Ref Range    WBC 7.9 4.6 - 13.2 K/uL    RBC 4.87 4.20 - 5.30 M/uL    HGB 15.3 12.0 - 16.0 g/dL    HCT 45.6 (H) 35.0 - 45.0 %    MCV 93.6 78.0 - 100.0 FL    MCH 31.4 24.0 - 34.0 PG    MCHC 33.6 31.0 - 37.0 g/dL    RDW 12.1 11.6 - 14.5 %    PLATELET 045 500 - 937 K/uL    MPV 10.1 9.2 - 11.8 FL    NRBC 0.0 0  WBC    ABSOLUTE NRBC 0.00 0.00 - 0.01 K/uL    NEUTROPHILS 81 (H) 40 - 73 %    LYMPHOCYTES 8 (L) 21 - 52 %    MONOCYTES 9 3 - 10 %    EOSINOPHILS 0 0 - 5 %    BASOPHILS 0 0 - 2 %    IMMATURE GRANULOCYTES 1 (H) 0.0 - 0.5 %    ABS. NEUTROPHILS 6.4 1.8 - 8.0 K/UL    ABS. LYMPHOCYTES 0.6 (L) 0.9 - 3.6 K/UL    ABS. MONOCYTES 0.7 0.05 - 1.2 K/UL    ABS. EOSINOPHILS 0.0 0.0 - 0.4 K/UL    ABS. BASOPHILS 0.0 0.0 - 0.1 K/UL    ABS. IMM.  GRANS. 0.1 (H) 0.00 - 0.04 K/UL    DF AUTOMATED     METABOLIC PANEL, BASIC    Collection Time: 12/28/21 12:04 PM   Result Value Ref Range    Sodium 139 136 - 145 mmol/L    Potassium 4.1 3.5 - 5.5 mmol/L    Chloride 106 100 - 111 mmol/L    CO2 29 21 - 32 mmol/L    Anion gap 4 3.0 - 18 mmol/L    Glucose 97 74 - 99 mg/dL    BUN 8 7.0 - 18 MG/DL    Creatinine 0.58 (L) 0.6 - 1.3 MG/DL    BUN/Creatinine ratio 14 12 - 20      GFR est AA >60 >60 ml/min/1.73m2    GFR est non-AA >60 >60 ml/min/1.73m2    Calcium 9.7 8.5 - 10.1 MG/DL   HCG QL SERUM    Collection Time: 12/28/21 12:04 PM   Result Value Ref Range    HCG, Ql. Negative NEG     COVID-19 WITH INFLUENZA A/B    Collection Time: 12/28/21 12:04 PM   Result Value Ref Range    SARS-CoV-2 Detected (AA) NOTD      Influenza A by PCR Not detected NOTD      Influenza B by PCR Not detected NOTD     EKG, 12 LEAD, INITIAL Collection Time: 12/28/21 12:10 PM   Result Value Ref Range    Ventricular Rate 93 BPM    Atrial Rate 93 BPM    P-R Interval 120 ms    QRS Duration 86 ms    Q-T Interval 320 ms    QTC Calculation (Bezet) 397 ms    Calculated P Axis 48 degrees    Calculated R Axis 61 degrees    Calculated T Axis 41 degrees    Diagnosis       Normal sinus rhythm  Normal ECG  No previous ECGs available     URINALYSIS W/ RFLX MICROSCOPIC    Collection Time: 12/28/21  1:25 PM   Result Value Ref Range    Color YELLOW      Appearance CLEAR      Specific gravity 1.021 1.005 - 1.030      pH (UA) 6.0 5.0 - 8.0      Protein Negative NEG mg/dL    Glucose Negative NEG mg/dL    Ketone 40 (A) NEG mg/dL    Bilirubin Negative NEG      Blood Negative NEG      Urobilinogen 1.0 0.2 - 1.0 EU/dL    Nitrites Negative NEG      Leukocyte Esterase SMALL (A) NEG     URINE MICROSCOPIC ONLY    Collection Time: 12/28/21  1:25 PM   Result Value Ref Range    WBC 4 to 10 0 - 4 /hpf    Epithelial cells 1+ 0 - 5 /lpf    Bacteria 1+ (A) NEG /hpf       Radiologic Studies -   CT HEAD WO CONT   Final Result      Negative CT scan of the brain. There is no hemorrhage, mass, nor acute infarct. Report provided to the emergency department at 1449 hrs. XR CHEST PORT   Final Result      Negative chest.           CT Results  (Last 48 hours)               12/28/21 1429  CT HEAD WO CONT Final result    Impression:      Negative CT scan of the brain. There is no hemorrhage, mass, nor acute infarct. Report provided to the emergency department at 1449 hrs. Narrative:  EXAM: CT HEAD WITHOUT CONTRAST. CLINICAL HISTORY/INDICATION:  head injury, syncope , pain       COMPARISON: None. TECHNIQUE: Routine axial images have been obtained from skull base to vertex at   5 mm thick slices.   All CT scans at this facility are performed using dose   optimization technique as appropriate to a performed exam, to include automated   exposure control, adjustment of the mA and/or kV according to patient's size   (including appropriate matching for site-specific examinations), or use of   iterative reconstruction technique. FINDINGS:       There are no intra nor extra axial fluid collections. There is no hemorrhage. The gray white differentiation is normal.       The ventricular system is midline without mass effect or shift. The paranasal sinuses which are included on this exam are well aerated and   unremarkable. CXR Results  (Last 48 hours)               12/28/21 1237  XR CHEST PORT Final result    Impression:      Negative chest.           Narrative:  EXAM: CHEST ONE VIEW  portable 1223 hours       CLINICAL HISTORY/INDICATION: syncope , fever, chills, body aches, syncope,   suspected Covid - 19 infection       COMPARISON: None. TECHNIQUE: One view obtained. FINDINGS:        The cardiac and mediastinal silhouette is normal. The lungs are clear. Pulmonary   vascularity is normal. The costophrenic angles are sharply defined. No bony   abnormalities are seen. Medical Decision Making     I am the first provider for this patient. Vital Signs- I personally reviewed and interpreted the patient's vital signs. Records Reviewed: I reviewed the patient's records to interpret any previous medical data available to me including EKGs, previous medical records, previous images, previous labs. Provider Notes (Medical Decision Making):     Healthy 43-year-old female presents to the ED for evaluation of a head injury after she had a syncopal episode while at an outside facility awaiting testing for COVID-19. She has been having a fever and URI-like symptoms since yesterday. On exam, she is overall well-appearing. She does have a fever and has a fever appropriate tachycardia. Neurovascularly intact. She does have a contusion to her left forehead.     Plan will be to obtain labs with CBC, BMP, COVID-19/influenza swab, will obtain CT brain as she did have syncopal episode with a head injury. ED Course:     ED Course as of 12/28/21 1755   Tue Dec 28, 2021   1755 Work-up overall unremarkable. COVID-19/influenza swab pending. She was informed that she will be updated with results when they are available. Patient remained stable throughout their ED course. I discussed relevant findings with patient. My plan is to discharge home with return precautions and PCP follow-up within two days. Additional verbal discharge instructions were given and discussed with the patient. Patient expressed understanding, agrees to plan, and all of their questions were answered.  [EK]      ED Course User Index  [EK] DO River Hanna DO  Date: 12/28/2021

## 2021-12-28 NOTE — ED NOTES
Pt amb with steady gait to restroom obtain urine sample. Pt encouraged to call for assistance if needed, verbalized understanding.

## 2021-12-28 NOTE — Clinical Note
[address] [facility] EMERGENCY DEPT  [address] [address] [phone]    Work/School Note    Date: 12/28/2021     To Whom It May concern:    Abisai Elena was evaluated by the following provider(s):  Attending Provider: Hernán Mayers virus is suspected. Per the CDC guidelines we recommend home isolation until the following conditions are all met:    1. At least 10 days have passed since symptoms first appeared and  2. At least 24 hours have passed since last fever without the use of fever-reducing medications and  3.  Symptoms (e.g., cough, shortness of breath) have improved      Sincerely,          Evy Kramer, DO

## 2021-12-28 NOTE — ED NOTES
I performed a brief evaluation, including history and physical, of the patient here in triage and I have determined that pt will need further treatment and evaluation from the main side ER physician. I have placed initial orders to help in expediting patients care.      December 28, 2021 at 12:03 PM - KERON Head

## 2021-12-28 NOTE — DISCHARGE INSTRUCTIONS
Your labs were overall normal in the ED, you had a normal brain scan. Your COVID-19/influenza test is pending, you will receive a phone call if it is positive. Follow-up with your PCP within 24 to 48 hours and quarantine for now until you have the results of your test.  No going to work unless you are fever free for 24 hours.

## 2021-12-29 LAB
ATRIAL RATE: 93 BPM
CALCULATED P AXIS, ECG09: 48 DEGREES
CALCULATED R AXIS, ECG10: 61 DEGREES
CALCULATED T AXIS, ECG11: 41 DEGREES
DIAGNOSIS, 93000: NORMAL
P-R INTERVAL, ECG05: 120 MS
Q-T INTERVAL, ECG07: 320 MS
QRS DURATION, ECG06: 86 MS
QTC CALCULATION (BEZET), ECG08: 397 MS
VENTRICULAR RATE, ECG03: 93 BPM

## 2025-01-08 DIAGNOSIS — Z13.228 SCREENING FOR METABOLIC DISORDER: ICD-10-CM

## 2025-01-08 DIAGNOSIS — Z13.0 SCREENING FOR DEFICIENCY ANEMIA: ICD-10-CM

## 2025-01-08 DIAGNOSIS — Z13.29 SCREENING FOR THYROID DISORDER: ICD-10-CM

## 2025-01-08 DIAGNOSIS — E55.9 VITAMIN D DEFICIENCY: ICD-10-CM

## 2025-01-08 DIAGNOSIS — E78.2 MIXED HYPERLIPIDEMIA: ICD-10-CM

## 2025-01-08 DIAGNOSIS — Z13.1 SCREENING FOR DIABETES MELLITUS: ICD-10-CM

## 2025-01-08 DIAGNOSIS — E78.2 MIXED HYPERLIPIDEMIA: Primary | ICD-10-CM

## 2025-01-08 DIAGNOSIS — Z11.59 ENCOUNTER FOR HEPATITIS C SCREENING TEST FOR LOW RISK PATIENT: ICD-10-CM

## 2025-01-08 PROBLEM — E78.5 HYPERLIPIDEMIA: Status: ACTIVE | Noted: 2025-01-08

## 2025-01-08 PROBLEM — A60.00 GENITAL HERPES SIMPLEX: Status: ACTIVE | Noted: 2021-02-16

## 2025-01-18 NOTE — MR AVS SNAPSHOT
Visit Information Date & Time Provider Department Dept. Phone Encounter #  
 10/18/2017  8:00 AM Pérez Nichols, [address] [place] [address] Doss Vandana [facility] [id number] Follow-up Instructions Return if symptoms worsen or fail to improve. Upcoming Health Maintenance Date Due DTaP/Tdap/Td series (1 - Tdap) 7/27/2007 PAP AKA CERVICAL CYTOLOGY 10/11/2015 INFLUENZA AGE 9 TO ADULT 8/1/2017 Allergies as of 10/18/2017  Review Complete On: 10/18/2017 By: Pérez Nichols MD  
 No Known Allergies Current Immunizations  Never Reviewed No immunizations on file. Not reviewed this visit You Were Diagnosed With   
  
 Codes Comments Left hip pain    -  Primary ICD-10-CM: A52.285 ICD-9-CM: 719.45 Paresthesia of right upper limb     ICD-10-CM: R20.2 ICD-9-CM: 693. 0 Vitals BP Pulse Temp Resp Height(growth percentile) Weight(growth percentile) 119/81 (BP 1 Location: Left arm, BP Patient Position: Sitting) 75 97.8 °F (36.6 °C) (Oral) 18 5' 5\" (1.651 m) 148 lb (67.1 kg) SpO2 BMI OB Status Smoking Status 98% 24.63 kg/m2 IUD Never Smoker BMI and BSA Data Body Mass Index Body Surface Area  
 24.63 kg/m 2 1.75 m 2 Preferred Pharmacy Pharmacy Name Phone [address], [address] [place] [address] [phone] Your Updated Medication List  
  
   
This list is accurate as of: 10/18/17  9:02 AM.  Always use your most recent med list.  
  
  
  
  
 cetirizine 10 mg tablet Commonly known as:  ZYRTEC Take 10 mg by mouth. MIRENA 20 mcg/24 hr (5 years) IUD Generic drug:  levonorgestrel 1 Each by IntraUTERine route once. Follow-up Instructions Return if symptoms worsen or fail to improve. To-Do List   
 10/18/2017 Imaging:  XR HIP LT W OR WO PELV 2-3 VWS Patient Instructions Hip Bursitis: Exercises Your Care Instructions Here are some examples of typical rehabilitation exercises for your condition. Start each exercise slowly. Ease off the exercise if you start to have pain. Your doctor or physical therapist will tell you when you can start these exercises and which ones will work best for you. How to do the exercises Hip rotator stretch 1. Lie on your back with both knees bent and your feet flat on the floor. 2. Put the ankle of your affected leg on your opposite thigh near your knee. 3. Use your hand to gently push your knee away from your body until you feel a gentle stretch around your hip. 4. Hold the stretch for 15 to 30 seconds. 5. Repeat 2 to 4 times. 6. Repeat steps 1 through 5, but this time use your hand to gently pull your knee toward your opposite shoulder. Iliotibial band stretch 1. Lean sideways against a wall. If you are not steady on your feet, hold on to a chair or counter. 2. Stand on the leg with the affected hip, with that leg close to the wall. Then cross your other leg in front of it. 3. Let your affected hip drop out to the side of your body and against wall. Then lean away from your affected hip until you feel a stretch. 4. Hold the stretch for 15 to 30 seconds. 5. Repeat 2 to 4 times. Straight-leg raises to the outside 1. Lie on your side, with your affected hip on top. 2. Tighten the front thigh muscles of your top leg to keep your knee straight. 3. Keep your hip and your leg straight in line with the rest of your body, and keep your knee pointing forward. Do not drop your hip back. 4. Lift your top leg straight up toward the ceiling, about 12 inches off the floor. Hold for about 6 seconds, then slowly lower your leg. 5. Repeat 8 to 12 times. Clamshell 1. Lie on your side, with your affected hip on top and your head propped on a pillow. Keep your feet and knees together and your knees bent. 2. Raise your top knee, but keep your feet together.  Do not let your hips roll back. Your legs should open up like a clamshell. 3. Hold for 6 seconds. 4. Slowly lower your knee back down. Rest for 10 seconds. 5. Repeat 8 to 12 times. Follow-up care is a key part of your treatment and safety. Be sure to make and go to all appointments, and call your doctor if you are having problems. It's also a good idea to know your test results and keep a list of the medicines you take. Where can you learn more? Go to http://tushar-nory.info/. Enter T374 in the search box to learn more about \"Hip Bursitis: Exercises. \" Current as of: March 21, 2017 Content Version: 11.3 © 4835-4482 [employer]. Care instructions adapted under license by [employer] (which disclaims liability or warranty for this information). If you have questions about a medical condition or this instruction, always ask your healthcare professional. [place] [address] any warranty or liability for your use of this information. Introducing [address]! Kerrie Mckeon introduces MemBlaze patient portal. Now you can access parts of your medical record, email your doctor's office, and request medication refills online. 1. In your internet browser, go to [email or web address] 2. Click on the First Time User? Click Here link in the Sign In box. You will see the New Member Sign Up page. 3. Enter your MemBlaze Access Code exactly as it appears below. You will not need to use this code after youve completed the sign-up process. If you do not sign up before the expiration date, you must request a new code. · MemBlaze Access Code: [email or web address] Expires: 1/16/2018  9:02 AM 
 
4. Enter the last four digits of your Social Security Number (xxxx) and Date of Birth (mm/dd/yyyy) as indicated and click Submit. You will be taken to the next sign-up page. 5. Create a MemBlaze ID.  This will be your MemBlaze login ID and cannot be changed, so think of one that is secure and easy to remember. 6. Create a [employer] password. You can change your password at any time. 7. Enter your Password Reset Question and Answer. This can be used at a later time if you forget your password. 8. Enter your e-mail address. You will receive e-mail notification when new information is available in [address] 9. Click Sign Up. You can now view and download portions of your medical record. 10. Click the Download Summary menu link to download a portable copy of your medical information. If you have questions, please visit the Frequently Asked Questions section of the [employer] website. Remember, [employer] is NOT to be used for urgent needs. For medical emergencies, dial 911. Now available from your iPhone and Android! Please provide this summary of care documentation to your next provider. Your primary care clinician is listed as NONE. If you have any questions after today's visit, please call [phone]. .

## 2025-04-18 ENCOUNTER — LAB (OUTPATIENT)
Facility: CLINIC | Age: 40
End: 2025-04-18

## 2025-04-18 ENCOUNTER — HOSPITAL ENCOUNTER (OUTPATIENT)
Facility: HOSPITAL | Age: 40
Setting detail: SPECIMEN
Discharge: HOME | End: 2025-04-21

## 2025-04-18 DIAGNOSIS — E55.9 VITAMIN D DEFICIENCY: ICD-10-CM

## 2025-04-18 PROCEDURE — 99001 SPECIMEN HANDLING PT-LAB: CPT

## 2025-04-19 LAB
A/G RATIO: 2.2 RATIO (ref 1.1–2.6)
ALBUMIN: 4.8 G/DL (ref 3.5–5)
ALP BLD-CCNC: 126 U/L (ref 25–115)
ALT SERPL-CCNC: 10 U/L (ref 5–40)
ANION GAP SERPL CALCULATED.3IONS-SCNC: 11 MMOL/L (ref 3–15)
AST SERPL-CCNC: 12 U/L (ref 10–37)
BILIRUB SERPL-MCNC: 0.4 MG/DL (ref 0.2–1.2)
BUN BLDV-MCNC: 15 MG/DL (ref 6–22)
CALCIUM SERPL-MCNC: 10 MG/DL (ref 8.4–10.5)
CHLORIDE BLD-SCNC: 104 MMOL/L (ref 98–110)
CHOLESTEROL, TOTAL: 235 MG/DL (ref 110–200)
CHOLESTEROL/HDL RATIO: 3.1 (ref 0–5)
CO2: 29 MMOL/L (ref 20–32)
CREAT SERPL-MCNC: 0.6 MG/DL (ref 0.5–1.2)
ESTIMATED AVERAGE GLUCOSE: 96 MG/DL (ref 91–123)
GFR, ESTIMATED: >60 ML/MIN/1.73 SQ.M.
GLOBULIN: 2.2 G/DL (ref 2–4)
GLUCOSE: 85 MG/DL (ref 70–99)
HBA1C MFR BLD: 5 % (ref 4.8–5.6)
HCT VFR BLD CALC: 46.7 % (ref 35.1–46.5)
HDLC SERPL-MCNC: 76 MG/DL
HEMOGLOBIN: 15.3 G/DL (ref 11.7–15.5)
LDL CHOLESTEROL: 136 MG/DL (ref 50–99)
LDL/HDL RATIO: 1.8
MCH RBC QN AUTO: 34 PG (ref 26–34)
MCHC RBC AUTO-ENTMCNC: 33 G/DL (ref 31–36)
MCV RBC AUTO: 103 FL (ref 80–99)
NON-HDL CHOLESTEROL: 159 MG/DL
PDW BLD-RTO: 12.6 % (ref 10–15.5)
PLATELET # BLD: 286 K/UL (ref 140–440)
PMV BLD AUTO: 10.1 FL (ref 9–13)
POTASSIUM SERPL-SCNC: 5 MMOL/L (ref 3.5–5.5)
RBC # BLD: 4.53 M/UL (ref 3.8–5.2)
SODIUM BLD-SCNC: 144 MMOL/L (ref 133–145)
TOTAL PROTEIN: 7 G/DL (ref 6.4–8.3)
TRIGL SERPL-MCNC: 115 MG/DL (ref 40–149)
TSH SERPL DL<=0.05 MIU/L-ACNC: 1.48 MCU/ML (ref 0.27–4.2)
VITAMIN D 25-HYDROXY: 45.7 NG/ML (ref 32–100)
VLDLC SERPL CALC-MCNC: 23 MG/DL (ref 8–30)
WBC # BLD: 7.5 K/UL (ref 4–11)

## 2025-04-21 LAB — SENTARA SPECIMEN COLLECTION: NORMAL

## 2025-04-29 ENCOUNTER — OFFICE VISIT (OUTPATIENT)
Facility: CLINIC | Age: 40
End: 2025-04-29
Payer: COMMERCIAL

## 2025-04-29 VITALS
HEIGHT: 65 IN | SYSTOLIC BLOOD PRESSURE: 136 MMHG | BODY MASS INDEX: 29.85 KG/M2 | HEART RATE: 76 BPM | RESPIRATION RATE: 18 BRPM | WEIGHT: 179.2 LBS | DIASTOLIC BLOOD PRESSURE: 89 MMHG | OXYGEN SATURATION: 98 %

## 2025-04-29 DIAGNOSIS — Z00.00 ENCOUNTER FOR WELL ADULT EXAM WITHOUT ABNORMAL FINDINGS: Primary | ICD-10-CM

## 2025-04-29 DIAGNOSIS — E78.2 MIXED HYPERLIPIDEMIA: ICD-10-CM

## 2025-04-29 PROCEDURE — 99385 PREV VISIT NEW AGE 18-39: CPT | Performed by: NURSE PRACTITIONER

## 2025-04-29 RX ORDER — VALACYCLOVIR HYDROCHLORIDE 1 G/1
1000 TABLET, FILM COATED ORAL DAILY
COMMUNITY
Start: 2025-04-10 | End: 2025-05-10

## 2025-04-29 RX ORDER — PREDNISOLONE ACETATE 10 MG/ML
SUSPENSION/ DROPS OPHTHALMIC
COMMUNITY
Start: 2025-04-21

## 2025-04-29 NOTE — PATIENT INSTRUCTIONS
Start OMEGA-3 Fatty Acid, or Krill Oil in 1,000 mg daily to improve cholesterol levels.         Learning About Foods That Are Good Sources of Fiber  What foods are high in fiber?     The foods you eat contain nutrients, such as vitamins and minerals. Fiber is a nutrient. Your body needs the right amount to stay healthy and work as it should. You can use the list below to help you make choices about which foods to eat.  Here are some examples of foods that are good sources of fiber.  Fruits  Apple  Apricot  Avocado  Banana  Blackberries  Cherries  Melon  Pear  Raspberries  Grains  Amaranth  Barley  Bran cereal  Farro  Oat bran  Oatmeal  Quinoa  Rice (brown or wild)  Whole-grain bread  Whole-grain English muffin  Protein foods  Almonds  Beans (black, kidney, navy, rangel)  Mina seeds  Garbanzo beans  Lentils  Pumpkin seeds  Split peas  Sunflower seeds  Vegetables  Artichoke  Broccoli  [place] sprouts  Cabbage  Carrots  Cauliflower  Eggplant  Green peas  Kale  Pumpkin  Sweet potato  White potato  Work with your doctor to find out how much of this nutrient you need. Depending on your health, you may need more or less of it in your diet.  Where can you learn more?  Go to https://www.healthtrustedsafe.net/patientEd and enter F355 to learn more about \"Learning About Foods That Are Good Sources of Fiber.\"  Current as of: October 7, 2024  Content Version: 14.4  © 2024-2025 [employer].   Care instructions adapted under license by [employer]. If you have questions about a medical condition or this instruction, always ask your healthcare professional. [employer], disclaims any warranty or liability for your use of this information.     Patient Education        High-Fiber Diet: Care Instructions  Overview     A high-fiber diet may help you relieve constipation and feel less bloated.  Your doctor and dietitian will help you make a high-fiber eating plan based on your personal needs. The plan will include the

## 2025-04-29 NOTE — PROGRESS NOTES
Well Adult Note  Name: Yun Umaña Today’s Date: 2025   MRN: [id number] Sex: Female   Age: 39 y.o. Ethnicity: Non- / Non    : 1985 Race: White (non-)      Yun Umaña is here for a well adult exam.       Assessment & Plan   Encounter for well adult exam without abnormal findings  Mixed hyperlipidemia  -     Lipid Panel; Future      Return in 1 year (on 2026) for CPE (Physical Exam), In Office (ONLY), Fasting Labs 1 Wk Prior.       Subjective   History:  See Problem L:ist    Review of Systems    No Known Allergies  Prior to Visit Medications    Medication Sig Taking? Authorizing Provider   valACYclovir (VALTREX) 1 g tablet Take 1 tablet by mouth daily Yes ProviderMansoor MD   prednisoLONE acetate (PRED FORTE) 1 % ophthalmic suspension INSTILL 1 DROP INTO RIGHT EYE 4 TIMES A DAY Yes Provider, Mansoor, MD   cetirizine (ZYRTEC) 10 MG tablet Take 1 tablet by mouth Yes Automatic Reconciliation, Ar   levonorgestrel (MIRENA) IUD 52 mg 1 each by IntraUTERine route once Yes Automatic Reconciliation, Ar     History reviewed. No pertinent past medical history.  Past Surgical History:   Procedure Laterality Date    GYN      Dand C    OTHER SURGICAL HISTORY      IUD removal under anesthesia    OVARIAN CYST REMOVAL       Family History   Problem Relation Age of Onset    Diabetes Mother      Social History     Tobacco Use    Smoking status: Never    Smokeless tobacco: Never   Substance Use Topics    Alcohol use: Yes    Drug use: No           Objective    Vital Signs  /89 (BP Site: Left Upper Arm, Patient Position: Sitting, BP Cuff Size: Medium Adult)   Pulse 76   Resp 18   Ht 1.651 m (5' 5\")   Wt 81.3 kg (179 lb 3.2 oz)   SpO2 98%   BMI 29.82 kg/m²     Wt Readings from Last 3 Encounters:   25 81.3 kg (179 lb 3.2 oz)   25 78 kg (172 lb)       Physical Exam  Vitals and nursing note reviewed. Exam conducted with a chaperone present.   Constitutional:

## 2025-04-29 NOTE — PROGRESS NOTES
Have you been to the ER, urgent care clinic since your last visit?  Hospitalized since your last visit?   NO    Have you seen or consulted any other health care providers outside our system since your last visit?   NO     “Have you had a pap smear?”    YES - [facility] to request most recent records if not in the chart    Date of last Cervical Cancer screen (HPV or PAP): 10/11/2012

## 2025-07-31 ENCOUNTER — OFFICE VISIT (OUTPATIENT)
Facility: CLINIC | Age: 40
End: 2025-07-31
Payer: COMMERCIAL

## 2025-07-31 VITALS
HEIGHT: 65 IN | RESPIRATION RATE: 16 BRPM | DIASTOLIC BLOOD PRESSURE: 82 MMHG | TEMPERATURE: 98.3 F | BODY MASS INDEX: 29.32 KG/M2 | OXYGEN SATURATION: 98 % | WEIGHT: 176 LBS | HEART RATE: 79 BPM | SYSTOLIC BLOOD PRESSURE: 138 MMHG

## 2025-07-31 DIAGNOSIS — N30.00 ACUTE CYSTITIS WITHOUT HEMATURIA: Primary | ICD-10-CM

## 2025-07-31 LAB
BILIRUBIN, URINE, POC: NEGATIVE
BLOOD URINE, POC: NEGATIVE
GLUCOSE URINE, POC: NEGATIVE
KETONES, URINE, POC: NEGATIVE
LEUKOCYTE ESTERASE, URINE, POC: NORMAL
NITRITE, URINE, POC: NEGATIVE
PH, URINE, POC: 7 (ref 4.6–8)
PROTEIN,URINE, POC: NEGATIVE
SPECIFIC GRAVITY, URINE, POC: 1.02 (ref 1–1.03)
URINALYSIS CLARITY, POC: NORMAL
URINALYSIS COLOR, POC: NORMAL
UROBILINOGEN, POC: NORMAL MG/DL

## 2025-07-31 PROCEDURE — 81001 URINALYSIS AUTO W/SCOPE: CPT | Performed by: NURSE PRACTITIONER

## 2025-07-31 PROCEDURE — 99214 OFFICE O/P EST MOD 30 MIN: CPT | Performed by: NURSE PRACTITIONER

## 2025-07-31 RX ORDER — VALACYCLOVIR HYDROCHLORIDE 1 G/1
1000 TABLET, FILM COATED ORAL DAILY
COMMUNITY
Start: 2025-07-05 | End: 2025-08-04

## 2025-07-31 RX ORDER — PHENAZOPYRIDINE HYDROCHLORIDE 100 MG/1
100 TABLET, FILM COATED ORAL 3 TIMES DAILY PRN
Qty: 9 TABLET | Refills: 0 | Status: SHIPPED | OUTPATIENT
Start: 2025-07-31 | End: 2025-08-03

## 2025-07-31 RX ORDER — SULFAMETHOXAZOLE AND TRIMETHOPRIM 800; 160 MG/1; MG/1
1 TABLET ORAL 2 TIMES DAILY
Qty: 6 TABLET | Refills: 0 | Status: SHIPPED | OUTPATIENT
Start: 2025-07-31 | End: 2025-08-03

## 2025-09-02 ENCOUNTER — OFFICE VISIT (OUTPATIENT)
Facility: CLINIC | Age: 40
End: 2025-09-02
Payer: COMMERCIAL

## 2025-09-02 VITALS
BODY MASS INDEX: 28.46 KG/M2 | HEART RATE: 64 BPM | DIASTOLIC BLOOD PRESSURE: 75 MMHG | SYSTOLIC BLOOD PRESSURE: 121 MMHG | WEIGHT: 170.8 LBS | RESPIRATION RATE: 18 BRPM | HEIGHT: 65 IN | OXYGEN SATURATION: 97 %

## 2025-09-02 DIAGNOSIS — E78.2 MIXED HYPERLIPIDEMIA: ICD-10-CM

## 2025-09-02 DIAGNOSIS — R03.0 PREHYPERTENSION: Primary | ICD-10-CM

## 2025-09-02 PROCEDURE — 99214 OFFICE O/P EST MOD 30 MIN: CPT | Performed by: NURSE PRACTITIONER
